# Patient Record
Sex: FEMALE | Race: WHITE | NOT HISPANIC OR LATINO | Employment: OTHER | URBAN - NONMETROPOLITAN AREA
[De-identification: names, ages, dates, MRNs, and addresses within clinical notes are randomized per-mention and may not be internally consistent; named-entity substitution may affect disease eponyms.]

---

## 2021-04-20 LAB
BILIRUB SERPL-MCNC: NEGATIVE MG/DL
BLOOD URINE, POC: NORMAL
CLARITY, POC UA: CLEAR
COLOR, POC UA: YELLOW
GLUCOSE UR QL STRIP: NEGATIVE
KETONES UR QL STRIP: NEGATIVE
LEUKOCYTE EST, POC UA: NEGATIVE
NITRITE, POC UA: NEGATIVE
PH, POC UA: 5.5
PROTEIN, POC: NEGATIVE
SPECIFIC GRAVITY, POC UA: NORMAL
UROBILINOGEN, POC UA: NORMAL

## 2021-04-28 ENCOUNTER — HISTORICAL (OUTPATIENT)
Dept: ADMINISTRATIVE | Facility: HOSPITAL | Age: 56
End: 2021-04-28

## 2021-05-11 ENCOUNTER — HISTORICAL (OUTPATIENT)
Dept: ADMINISTRATIVE | Facility: HOSPITAL | Age: 56
End: 2021-05-11

## 2021-06-23 LAB
BILIRUB SERPL-MCNC: NEGATIVE MG/DL
BLOOD URINE, POC: NORMAL
CLARITY, POC UA: CLEAR
COLOR, POC UA: YELLOW
GLUCOSE UR QL STRIP: NEGATIVE
KETONES UR QL STRIP: NEGATIVE
LEUKOCYTE EST, POC UA: NEGATIVE
NITRITE, POC UA: NEGATIVE
PH, POC UA: 5
PROTEIN, POC: NEGATIVE
SPECIFIC GRAVITY, POC UA: 1.02
UROBILINOGEN, POC UA: NORMAL

## 2021-08-03 LAB
ALBUMIN SERPL-MCNC: 4.7 G/DL (ref 3.4–5)
ALBUMIN/GLOB SERPL: 1.6 {RATIO}
ALP SERPL-CCNC: 68 U/L (ref 50–144)
ALT SERPL-CCNC: 21 U/L (ref 1–45)
AMYLASE SERPL-CCNC: 60 U/L (ref 28–100)
ANION GAP SERPL CALC-SCNC: 5 MMOL/L (ref 7–16)
AST SERPL-CCNC: 27 U/L (ref 14–36)
BASOPHILS # BLD AUTO: 0.03 X10(3)/MCL (ref 0.01–0.08)
BASOPHILS NFR BLD AUTO: 0.8 % (ref 0.1–1.2)
BASOPHILS NFR BLD MANUAL: 0 % (ref 0–2)
BILIRUB SERPL-MCNC: 0.63 MG/DL (ref 0.1–1)
BUN SERPL-MCNC: 12 MG/DL (ref 7–20)
CALCIUM SERPL-MCNC: 9.6 MG/DL (ref 8.4–10.2)
CHLORIDE SERPL-SCNC: 105 MMOL/L (ref 94–110)
CHOLEST SERPL-MCNC: 260 MG/DL (ref 0–200)
CO2 SERPL-SCNC: 29 MMOL/L (ref 21–32)
CREAT SERPL-MCNC: 0.7 MG/DL (ref 0.52–1.04)
CREAT/UREA NIT SERPL: 17.1 (ref 12–20)
DEPRECATED CALCIDIOL+CALCIFEROL SERPL-MC: 35.1 NG/ML (ref 30–100)
EOSINOPHIL # BLD AUTO: 0.12 X10(3)/MCL (ref 0.04–0.36)
EOSINOPHIL NFR BLD AUTO: 3.2 % (ref 0.7–7)
EOSINOPHIL NFR BLD MANUAL: 5 % (ref 0–3)
ERYTHROCYTE [DISTWIDTH] IN BLOOD BY AUTOMATED COUNT: 12.1 % (ref 11–14.5)
EST CREAT CLEARANCE SER (OHS): 121.7 ML/MIN
EST. AVERAGE GLUCOSE BLD GHB EST-MCNC: 108 MG/DL (ref 70–115)
ESTRADIOL SERPL HS-MCNC: 37.4 PG/ML
FSH SERPL-ACNC: 23.9 MIU/ML
GLOBULIN SER-MCNC: 2.9 G/DL (ref 2–3.9)
GLUCOSE SERPL-MCNC: 107 MGM./DL (ref 70–115)
GRANULOCYTES NFR BLD MANUAL: 51 % (ref 37–73)
H PYLORI AB SER IA-ACNC: NEGATIVE
HBA1C MFR BLD: 5.6 % (ref 4–6)
HCT VFR BLD AUTO: 39.3 % (ref 36–48)
HDLC SERPL-MCNC: 46 MG/DL (ref 40–60)
HGB BLD-MCNC: 13.3 G/DL (ref 11.8–16)
IMM GRANULOCYTES # BLD AUTO: 0.03 X10E3/UL (ref 0–0.03)
IMM GRANULOCYTES NFR BLD AUTO: 0.8 % (ref 0–0.5)
LDLC SERPL CALC-MCNC: 160.2 MG/DL (ref 30–100)
LH SERPL-ACNC: 15.2 MIU/ML
LIPASE SERPL-CCNC: 68 U/L (ref 23–300)
LYMPHOCYTES # BLD AUTO: 1.23 X10(3)/MCL (ref 1.16–3.74)
LYMPHOCYTES NFR BLD AUTO: 32.7 % (ref 20–55)
LYMPHOCYTES NFR BLD MANUAL: 33 % (ref 20–55)
MCH RBC QN AUTO: 30.1 PG (ref 27–34)
MCHC RBC AUTO-ENTMCNC: 33.8 G/DL (ref 31–37)
MCV RBC AUTO: 88.9 FL (ref 79–99)
METAMYELOCYTES NFR BLD MANUAL: 0 % (ref 0–1)
MONOCYTES # BLD AUTO: 0.49 X10(3)/MCL (ref 0.24–0.36)
MONOCYTES NFR BLD AUTO: 13 % (ref 4.7–12.5)
MONOCYTES NFR BLD MANUAL: 11 % (ref 0–10)
MYELOCYTES NFR BLD MANUAL: 0 %
NEUTROPHILS # BLD AUTO: 1.86 X10(3)/MCL (ref 1.56–6.13)
NEUTROPHILS NFR BLD AUTO: 49.5 % (ref 37–73)
NEUTS BAND NFR BLD MANUAL: 0 % (ref 0–5)
NRBC BLD MANUAL-RTO: 0 % (ref 0–1)
PLATELET # BLD AUTO: 255 X10(3)/MCL (ref 140–371)
PMV BLD AUTO: 10.7 FL (ref 9.4–12.4)
POTASSIUM SERPL-SCNC: 4 MMOL/L (ref 3.5–5.1)
PROGEST SERPL-MCNC: 0.37 NG/ML
PROT SERPL-MCNC: 7.6 G/DL (ref 6.3–8.2)
RBC # BLD AUTO: 4.42 X10(6)/MCL (ref 4–5.1)
SODIUM SERPL-SCNC: 139 MMOL/L (ref 135–145)
TESTOST SERPL-MCNC: 26.4 NG/DL (ref 5.77–77)
TRIGL SERPL-MCNC: 164 MG/DL (ref 30–200)
TSH SERPL-ACNC: 1.44 UIU/ML (ref 0.36–3.74)
VIT B12 SERPL-MCNC: 612 PG/ML (ref 211–946)
WBC # SPEC AUTO: 3.8 X10(3)/MCL (ref 4–11.5)

## 2021-08-16 ENCOUNTER — HISTORICAL (OUTPATIENT)
Dept: RADIOLOGY | Facility: HOSPITAL | Age: 56
End: 2021-08-16

## 2021-11-02 LAB
BASOPHILS # BLD AUTO: 0.02 10*3/UL (ref 0.01–0.08)
BASOPHILS NFR BLD AUTO: 0.4 % (ref 0.1–1.2)
CHOLEST SERPL-MCNC: 239 MG/DL (ref 0–200)
EOSINOPHIL # BLD AUTO: 0.21 10*3/UL (ref 0.04–0.36)
EOSINOPHIL NFR BLD AUTO: 4.5 % (ref 0.7–7)
ERYTHROCYTE [DISTWIDTH] IN BLOOD BY AUTOMATED COUNT: 12.5 % (ref 11–14.5)
HCT VFR BLD AUTO: 37.6 % (ref 36–48)
HDLC SERPL-MCNC: 51 MG/DL (ref 40–60)
HGB BLD-MCNC: 12.5 G/DL (ref 11.8–16)
IMM GRANULOCYTES # BLD AUTO: 0 10*3/UL (ref 0–0.03)
IMM GRANULOCYTES NFR BLD AUTO: 0 % (ref 0–0.5)
LDLC SERPL CALC-MCNC: 162.2 MG/DL (ref 30–100)
LYMPHOCYTES # BLD AUTO: 1.58 10*3/UL (ref 1.16–3.74)
LYMPHOCYTES NFR BLD AUTO: 34.1 % (ref 20–55)
MCH RBC QN AUTO: 29.7 PG (ref 27–34)
MCHC RBC AUTO-ENTMCNC: 33.2 G/DL (ref 31–37)
MCV RBC AUTO: 89.3 FL (ref 79–99)
MONOCYTES # BLD AUTO: 0.57 10*3/UL (ref 0.24–0.36)
MONOCYTES NFR BLD AUTO: 12.3 % (ref 4.7–12.5)
NEUTROPHILS # BLD AUTO: 2.25 10*3/UL (ref 1.56–6.13)
NEUTROPHILS NFR BLD AUTO: 48.7 % (ref 37–73)
PLATELET # BLD AUTO: 279 10*3/UL (ref 140–371)
PMV BLD AUTO: 11 FL (ref 9.4–12.4)
RBC # BLD AUTO: 4.21 10*6/UL (ref 4–5.1)
TRIGL SERPL-MCNC: 149 MG/DL (ref 30–200)
WBC # SPEC AUTO: 4.6 10*3/UL (ref 4–11.5)

## 2021-11-04 LAB
CRC RECOMMENDATION EXT: NORMAL
CRC RECOMMENDATION EXT: NORMAL

## 2022-04-09 ENCOUNTER — HISTORICAL (OUTPATIENT)
Dept: ADMINISTRATIVE | Facility: HOSPITAL | Age: 57
End: 2022-04-09
Payer: MEDICAID

## 2022-04-26 VITALS
WEIGHT: 190 LBS | HEIGHT: 67 IN | DIASTOLIC BLOOD PRESSURE: 68 MMHG | SYSTOLIC BLOOD PRESSURE: 124 MMHG | BODY MASS INDEX: 29.82 KG/M2

## 2022-04-27 LAB
HUMAN PAPILLOMAVIRUS (HPV): NORMAL
PAP RECOMMENDATION EXT: NORMAL
PAP SMEAR: NORMAL

## 2022-04-29 ENCOUNTER — HISTORICAL (OUTPATIENT)
Dept: ADMINISTRATIVE | Facility: HOSPITAL | Age: 57
End: 2022-04-29
Payer: MEDICAID

## 2022-05-02 NOTE — HISTORICAL [facility] CERNER
This is a historical note converted from Cermanda. Formatting and pictures may have been removed.  Please reference Cermanda for original formatting and attached multimedia. Chief Complaint  New patient, Annual. states she is feeling fine, denies any breast pain, or abnormal discharge.. is currently on cycle. LMP 4/15/2021.  History of Present Illness  54 y/o WF  for initial gyn exam  LMP 4/15/21, usually bleeds 7 days, spotting today  menses cyclic, mild cramping  reports hx of uterine fibroids, dx 2-3 years ago - denies irregular vaginal bleeding  Denies family history of breast, uterine, ovarian and colon cancer. UTD on colonoscopy  Gynecological History  Last Menstrual Period: 04/15/21  Menstrual Status Intake: Menarcheal  STIs/STDs: No  Intermenstrual Bleeding: No  Abnormal Pap: No  Current Birth Control Method: Abstinence  Dysmenorrhea: Mild  Flow: Moderate  Duration of Flow: 7  Frequency of Cycle: monthly  Additional GYN Information: LAST PAP unknown  Discharge OB: none  Sexual Problems OB: none  Urinary Incontinence: often.  Sexually Active: No  Review of Systems  General/Constitutional:?  Chills?denies. Fatigue/weakness?denies?. Fever?denies?. Night sweats?denies?.  Skin:  Rash?denies.  Respiratory:  Cough?denies?. Hemoptysis?denies?. SOB?denies?. Sputum production?denies?. Wheezing?denies?.  Cardiovascular:  Chest pain?denies. Dizziness?denies. Palpitations?denies. Swelling in hands/feet?denies.  Breast:  Changes in skin of nipple or breast?denies?. Breast lump?denies. Breast pain?denies. Nipple discharge?denies?.  Gastrointestinal:  Abdominal pain?denies?. Blood in stool?denies?. Constipation?denies?. Diarrhea?denies?. Heartburn?denies?. Nausea?denies?. Vomiting?deniesGenitourinary:  Incontinence?denies?. Blood in urine?denies. Frequent urination?denies?. Painful urination?denies.  Gyneocologic:  Irregular menses?denies. Heavy bleeding?denies. Painful menses?admits. Vaginal discharge/ Odor?denies?.  Vaginal lesion/pain?denies. ?Pelvic pain?denies?. Decreased libido?denies. Vulvar lesion/ulcer?denies?. Prolapse of genital organs?denies?. Painful intercourse?denies?.  Menopausal:  Hot flushes?denies. Vaginal dryness?denies.  Musculoskeletal:  Joint/andie pain?denies. Decreased ROM?denies. Muscle aches?denies. Swollen joints?denies?. Weakness?denies.  Neurologic:  Confusion?denies. Trouble walking?denies. Trouble with balance?denies?Balance difficulty?denies. Gait abnormalities?denies. Headache?denies. Tingling/Numbness?denies.  Psychiatric:  Mood lability?denies.?Anxiety or panic attacks?denies. Depressed mood?denies. Suicidal thoughts?denies.?  Physical Exam  Vitals & Measurements  T:?36.5? ?C (Temporal Artery)? BP:?130/76?  HT:?165.00?cm? WT:?90.100?kg? BMI:?33.09? LMP:?04/15/2021 00:00 CDT?  Chaperone: present  ?   General appearance: - healthy, well-nourished and well-developed  ?   Psychiatric: Orientation to time, place and person. Normal mood and affect and active, alert  ?   Skin:  Appearance: no rashes or lesions  ?   Neck:  Neck: supple, FROM, trachea midline. and no masses  Thyroid: no enlargement or nodules and non-tender.  ?  ?   Cardiovascular:  Auscultation: RRR and no murmur  Peripheral Vascular: no varicosities, LLE edema, RLE edema, calf tenderness, and palpable cord and pedal pulses intact  ?   Lungs:  Respiratory effort: no intercostal retractions or accessory muscle usage  Auscultation: no wheezing, rales/crackles, or rhonchi and clear to auscultation  ?  Breast:  Inspection/Palpation: no tenderness, discrete/distinct masses, skin changes, or abnormal secretions. Nipple appearance normal.  ?  Abdomen:  Auscultation/Inspection/Palpation: no hepatomegaly, splenomegaly, masses , tenderness? or CVA tenderness and soft, non distended bowel sounds preset  Hernia: no palpable hernias  ?  Female Genitalia:  Vulva: no masses,?tenderness or?lesions  Bladder/Urethra: no urethral discharge or mass, normal  meatus, bladder non-distended  Vagina: no tenderness, erythema, cystocele, rectocele, abnormal vaginal discharge, or vesicle(s) or ulcers  Cervix: no discharge , no cervical lacerations noted or motion tenderness and grossly normal  Uterus:?enlarged, irregular?midline, non-tender, and no uterine prolapse.  Adnexa/Parametria: no parametrial tenderness or mass, no adnexal tenderness or ovarian mass  ?  Lymph Nodes:  Palpation: non tender submandibular nodes, axillary nodes, or inguinal nodes  ?  Rectal Exam:  Rectum: normal perianal skin  Assessment/Plan  1.?Encounter for gynecological examination (general) (routine) with abnormal findings?Z01.411  ?BSE reviewed and recommended. Reviewed calcium needs, exercise, and prevention of osteoporosis. Periodic colonoscopy screening recommended. Reviewed normal menopause and menopausal symptoms.? Mammogram recommended yearly.  ?  Ordered:  Clinic Follow up, *Est. 22 3:00:00 CDT, Order for future visit, Encounter for gynecological examination (general) (routine) with abnormal findings, MOHIT CABALLERO  [facility] 40-64 years [id number] , Encounter for gynecological examination (general) (routine) with abnormal findings, MOHIT CABALLERO, 21 14:06:00 CDT  ?  2.?Dysmenorrhea?N94.6  ?  3.?Screening mammogram, encounter for?Z12.31  ?  Uterine fibroid?D25.9  Pelvic ultrasound?and follow-up with Dr. Jasiel Marquis for discussion  ?  ?DOMINGO?for? Manual?to determine?stability of fibroids  ?  Referrals  Clinic Follow up, *Est. 22 3:00:00 CDT, Order for future visit, Encounter for gynecological examination (general) (routine) with abnormal findings, MOHIT CABALLERO  Clinic Follow up, Order for future visit   OB History  Pregnancy History???(2,0,0,2)?? ??  Pregnancy # 1  Baby 1?????????????Outcome Date:?1991????? Outcome:?Live Birth  ???Outcome or Result:?Vaginal  ???Gender:?--????????Gest Age:?39 weeks  ??????Wt:?--  ???Hospital:?--????????[facility] Labor:?--  ???Shonna Name:?--?????Babys Father:?--  ?  Pregnancy # 2  Baby 1?????????????Outcome Date:?1995????? Outcome:?Live Birth  ???Outcome or Result:?Vaginal  ???Gender:?--????????Gest Age:?39 weeks ??????Wt:?--  ???Hospital:?--????????[facility] Labor:?--  ???Shonna Name:?--?????Babys Father:?--  Problem List/Past Medical History  Ongoing  Anxiety  Obesity  Historical  Pregnant  Pregnant  Procedure/Surgical History  Gallbladder   Medications  No active medications  Allergies  No Known Medication Allergies  Social History  Abuse/Neglect  No, No, Yes, 2021  Alcohol  Current, Wine, 1-2 times per month, 2018  Sexual  Sexually active: No. Gender Identity Identifies as female. No, 2021  Tobacco  Never (less than 100 in lifetime), N/A, 2021  Marital Status    Family History  Family history is negative  Health Maintenance  Health Maintenance  ???Pending?(in the next year)  ??? ??OverDue  ??? ? ? ?Alcohol Misuse Screening due??21??and every 1??year(s)  ??? ??Due?  ??? ? ? ?ADL Screening due??21??and every 1??year(s)  ??? ? ? ?Tetanus Vaccine due??21??and every 10??year(s)  ??? ??Due In Future?  ??? ? ? ?Obesity Screening not due until??22??and every 1??year(s)  ???Satisfied?(in the past 1 year)  ??? ??Satisfied?  ??? ? ? ?Blood Pressure Screening on??21.??Satisfied by Fadumo Dunn  ??? ? ? ?Body Mass Index Check on??21.??Satisfied by Fadumo Dunn  ??? ? ? ?Obesity Screening on??21.??Satisfied by Fadumo Dunn  ?

## 2022-05-03 ENCOUNTER — HISTORICAL (OUTPATIENT)
Dept: ADMINISTRATIVE | Facility: HOSPITAL | Age: 57
End: 2022-05-03

## 2022-05-11 DIAGNOSIS — Z91.89 AT HIGH RISK FOR BREAST CANCER: Primary | ICD-10-CM

## 2022-08-02 DIAGNOSIS — Z91.89 AT HIGH RISK FOR BREAST CANCER: Primary | ICD-10-CM

## 2022-08-18 ENCOUNTER — DOCUMENTATION ONLY (OUTPATIENT)
Dept: ADMINISTRATIVE | Facility: HOSPITAL | Age: 57
End: 2022-08-18
Payer: MEDICAID

## 2022-09-15 ENCOUNTER — HISTORICAL (OUTPATIENT)
Dept: ADMINISTRATIVE | Facility: HOSPITAL | Age: 57
End: 2022-09-15
Payer: MEDICAID

## 2022-10-10 NOTE — PROGRESS NOTES
[facility] Breast Surg  Breast Surgical Oncology  New Patient Office Visit - H&P    Chief Complaint:   Chief Complaint   Patient presents with    Follow-up     OQ Bilateral breast pain x 3 months since her menstrual cycles are changing, intermittent pain but constant soreness, pain scale 3/10 today, not taking any medication for pain        Subjective:      Interval History:   10/13/2022 She is doing well in interval. She currently denies any breast issues such as rashes, redness, swelling, nipple discharge, or new lumps/masses. She thinks she is going through menopause at this current time. Her periods have been irregular. She had a follow up Breast MRI scheduled for this month  (it was suppose to be a 6 month interval follow up MRI)  but her menstrual cycle interfered and she had to cancel. She states she has not rescheduled at this time.     Of note, Her lifetime risk for breast cancer was calculated using the Tyrer-Cuzick model and found to be elevated (28%).     She complains of generalized breast pain and states it went away when she stopped drinking caffeine but she is drinking tea and now the pain is back. She also is wearing wire bras.     History of Present Illness:  Ms. Suma Medrano a pleasant female patient who initially presents to breast clinic 7/22/2021 at age 55 with abnormal right breast imaging performed at an outside facility.      Her recent imaging began with a SCR MG with CAD at [facility] on 4/28/21, which noted a loosely clustered group of microcalcifications having an indeterminant appearance in the UOQ of the right breast. Further work up with a R DG MG with CAD and a R US on 5/11/2021, which was again indeterminant and recommended a breast MRI to evaluate the calcifications. BL Breast MRI w w/o Contrast on 6/1/2021 at [facility] in [place] recommended stereotactic biopsy to target the previous noted regional  calcifications. The patient has prior breast imaging that was done at [facility] many years ago but we are unable to obtain these records.      She does report a history of a trauma to the right breast upper outer quadrant when she was in her 20s consistent with a possible hematoma. She states that she has had a lump in this area since. She does report cyclic bilateral breast pain and fibrocystic changes around the time of menstruation. Otherwise, she currently denies any breast issues including rashes, redness, nipple discharge, or new lumps/masses.      According to NCCN guidelines,  she does not currently meet criteria for genetic testing. Will continue to monitor.    Imagin. 2021 SCR MG w/ CAD at [facility] - INCOMPLETE: Loosely clustered group of microcalcifications having indeterminate appearance are noted in the upper outer quadrant of the right breast.  These cannot be said with certainty to represent a benign process and further work-up with spot magnification views and ultrasound is recommended for better evaluation and to exclude neoplastic process.  2. 2021 R DG MG and R US - INCOMPLETE: Spot magnification views confirm the presence of loosely clustered, indeterminate microcalcifications with no worrisome underlying stellate mass appreciated.  No sonographic evidence of malignancy reported.  Further work-up with breast MRI is recommended to exclude remote possibility of a neoplastic process.  3. 2021 BL Breast MRI w/ w/o contrast - BIRADS 4B: Suspicious of malignancy.  Revealed a regional non-mass enhancements in the upper outer quadrant of the right breast measuring 5.8 x 1.9 cm. More medially in this area is a 14 mm area of non-mass enhancement within the fundal glandular tissue. This most likely correlates with the area of heterogenous echogenicity on the prior US.  No enhancing mass or non-mass enhancement noted to the left breast.  Biopsy recommended. Consider stereotactic biopsy to target the previous noted regional calcifications.  4. 8/16/2021 R DG MG and R breast US - SUSPICIOUS OF MALIGNANCY 1. Punctate and round calcifications in a regional distribution in the 10:00 right breast, middle to posterior depths, are suspicious. As these calcifications are seen sonographically, an ultrasound-guided biopsy of the calcifications in the 10:00 right breast, 4 cm from the nipple, is recommended. Please see separately dictated report from the ultrasound guided biopsy performed on the same day. 2. Fibrocystic changes in the upper outer quadrant of the right breast and 4:00 right breast are benign.         Pathology:   1. 8/16/2021 Right breast 10:00 4 cm from nipple mass core needle biopsy-epithelial hyperplasia of the usual type, moderate.  Sclerosing adenosis.  Pseudoangiomatous stromal hyperplasia.  Cystic and apocrine change.  No atypia.  Periductal fibrosis.  Focal periductal chronic inflammation.  Focal intraductal calcification. A follow-up bilateral breast MR in December 2021 is recommended for follow-up of the nonmass enhancement in the upper outer quadrant of the right breast as well as the non mass enhancement more medially within the right breast.    OB / GYN History   Menarche Onset: 15  Menopause: Pre, regular  Hormonal birth control (duration): a few months  Pregnancies: 2  Age at first child birth: 26  Child births: 2  Breastfeeding duration:  2years  Hysterectomy: no  Oophorectomy: no  HRT: no      Family History of Cancer (& age at diagnosis):   - Father: lung cancer (smoker)   - Paternal Aunt: lung cancer (smoker)   - Maternal GM: thyroid cancer (smoker)   - Denies history of breast, pancreatic, colon, or ovarian cancer      Lifestyle:  Smoker: Never (less than 100 in lifetime)  Height and Weight:   BMI:   ETOH consumption: yes,  occasionally      Other:  # of breast biopsies (when and pathology results): none  MG breast density:  unknown  Prior thoracic RT: none  Genetic testing: none  Ashkenazi Scientologist descent: No    Other History:     History reviewed. No pertinent past medical history.     Past Surgical History:   Procedure Laterality Date    CHOLECYSTECTOMY      COLONOSCOPY  11/04/2021    Martha Hull MD  [facility]    ESOPHAGOGASTRODUODENOSCOPY  11/04/2021    Martha Hull MD  [facility]        Social History     Socioeconomic History    Marital status:    Tobacco Use    Smoking status: Never    Smokeless tobacco: Never   Substance and Sexual Activity    Alcohol use: Yes    Drug use: Never          There is no immunization history on file for this patient.     Medications/Allergies:    Current Outpatient Medications on File Prior to Visit   Medication Sig Dispense Refill    diclofenac sodium (VOLTAREN) 1 % Gel Apply topically. prn      hydroCHLOROthiazide (MICROZIDE) 12.5 mg capsule Take 12.5 mg by mouth.      pantoprazole (PROTONIX) 40 MG tablet Take 40 mg by mouth.      naproxen (NAPROSYN) 500 MG tablet Take 1 tablet (500 mg total) by mouth 2 (two) times daily with meals. (Patient not taking: Reported on 10/13/2022) 30 tablet 0     No current facility-administered medications on file prior to visit.        Review of patient's allergies indicates:   Allergen Reactions    Sulfa (sulfonamide antibiotics) Anaphylaxis        Review of Systems:      Comprehensive ROS normal except: see HPI       Objective/Physical Exam     Vitals:    10/13/22 1045   BP: (!) 149/82   Pulse: 73   Resp: 18   Temp: 97.5 °F (36.4 °C)        General: The patient is awake, alert and oriented times three. The patient is well nourished and in no acute distress.  Neck: There is no evidence of palpable cervical, supraclavicular or axillary adenopathy. The neck is supple.   Musculoskeletal: The patient has a normal range of motion of her bilateral upper extremities.  Chest: Examination of the chest wall fails to  reveal any obvious abnormalities. Nonlabored breathing, symmetric expansion.  Breast:  Right: Examination of right breast fails to reveal any dominant masses or areas of significant focal nodularity. The nipple is everted without evidence of discharge. There is no skin dimpling with movement of the pectoralis. There are no significant skin changes overlying the breast.   Left: Examination of the left breast fails to reveal any dominant masses or areas of significant focal nodularity. The nipple is everted without evidence of discharge. There is no skin dimpling with movement of the pectoralis. There are no significant skin changes overlying the breast.  Abdomen: The abdomen is soft, flat, nontender and nondistended.  Integumentary: no rashes or skin lesions present  Neurologic: cranial nerves intact, no signs of peripheral neurological deficit, motor/sensory function intact       Assessment and Plan     There is no problem list on file for this patient.       Suma was seen today for follow-up.    Diagnoses and all orders for this visit:    At high risk for breast cancer  -     MRI Breast w/wo Contrast, w/CAD, Bilateral; Future    Personal history of benign breast biopsy  -     MRI Breast w/wo Contrast, w/CAD, Bilateral; Future    Abnormal MRI  -     MRI Breast w/wo Contrast, w/CAD, Bilateral; Future    Mastalgia in female  -     MRI Breast w/wo Contrast, w/CAD, Bilateral; Future    Screening mammogram for breast cancer  -     MRI Breast w/wo Contrast, w/CAD, Bilateral; Future  -     Mammo Digital Screening Bilat; Future     ---------------------------------------------------------------------------------------------    PLAN:  1. Recommend follow up breast MRI at next available and at appropriate time during patients menstrual cycle. She was suppose to have this done back in December of 2021.   2. RTC tentatively in 2 months to go over MRI and SCR MG.   3. Patients last SCR MG was over a year ago. Due to patients  history of not getting follow up MRI done, will go ahead and order a SCR MG so there will at least be one imaging done if patient doesn't complete MRI in the interval. SCR MG to be scheduled at next available as well.   4. Healthy lifestyle guidelines were reviewed. She was encouraged to engage in regular exercise, maintain a healthy body weight, and avoid excessive alcohol consumption. Healthy nutritional guidelines were also discussed. Self-breast examination was reviewed with the patient in detail and she was encouraged to perform this on a monthly basis.   5. Please call our office with any questions or concerns that may arise before the next appointment.   6. I discussed ways to reduce breast pain/tenderness. Avoid caffeine (coffee, teas, chocolate, sodas). Drinking alcohol may also increase the risk of fibrocystic changes and breast pain. I also advised to wear a good, supportive bra both day and night when symptoms are worse. Avoid contact sports and other activities which could cause injury to the breasts. Also encouraged patient to try Voltaren gel.       All of her questions were answered.     SAMANTHA Paul

## 2022-10-13 ENCOUNTER — OFFICE VISIT (OUTPATIENT)
Dept: SURGERY | Facility: CLINIC | Age: 57
End: 2022-10-13
Payer: MEDICAID

## 2022-10-13 VITALS
RESPIRATION RATE: 18 BRPM | SYSTOLIC BLOOD PRESSURE: 149 MMHG | BODY MASS INDEX: 31.45 KG/M2 | HEIGHT: 67 IN | TEMPERATURE: 98 F | OXYGEN SATURATION: 97 % | DIASTOLIC BLOOD PRESSURE: 82 MMHG | WEIGHT: 200.38 LBS | HEART RATE: 73 BPM

## 2022-10-13 DIAGNOSIS — N64.4 MASTALGIA IN FEMALE: ICD-10-CM

## 2022-10-13 DIAGNOSIS — Z91.89 AT HIGH RISK FOR BREAST CANCER: Primary | ICD-10-CM

## 2022-10-13 DIAGNOSIS — R93.89 ABNORMAL MRI: ICD-10-CM

## 2022-10-13 DIAGNOSIS — Z12.31 SCREENING MAMMOGRAM FOR BREAST CANCER: ICD-10-CM

## 2022-10-13 DIAGNOSIS — Z98.890 PERSONAL HISTORY OF BENIGN BREAST BIOPSY: ICD-10-CM

## 2022-10-13 PROCEDURE — 99999 PR PBB SHADOW E&M-EST. PATIENT-LVL IV: CPT | Mod: PBBFAC,,,

## 2022-10-13 PROCEDURE — 99213 OFFICE O/P EST LOW 20 MIN: CPT | Mod: S$PBB,,,

## 2022-10-13 PROCEDURE — 99214 OFFICE O/P EST MOD 30 MIN: CPT | Mod: PBBFAC

## 2022-10-13 PROCEDURE — 3079F DIAST BP 80-89 MM HG: CPT | Mod: CPTII,,,

## 2022-10-13 PROCEDURE — 1159F PR MEDICATION LIST DOCUMENTED IN MEDICAL RECORD: ICD-10-PCS | Mod: CPTII,,,

## 2022-10-13 PROCEDURE — 3079F PR MOST RECENT DIASTOLIC BLOOD PRESSURE 80-89 MM HG: ICD-10-PCS | Mod: CPTII,,,

## 2022-10-13 PROCEDURE — 99999 PR PBB SHADOW E&M-EST. PATIENT-LVL IV: ICD-10-PCS | Mod: PBBFAC,,,

## 2022-10-13 PROCEDURE — 1160F RVW MEDS BY RX/DR IN RCRD: CPT | Mod: CPTII,,,

## 2022-10-13 PROCEDURE — 1160F PR REVIEW ALL MEDS BY PRESCRIBER/CLIN PHARMACIST DOCUMENTED: ICD-10-PCS | Mod: CPTII,,,

## 2022-10-13 PROCEDURE — 99213 PR OFFICE/OUTPT VISIT, EST, LEVL III, 20-29 MIN: ICD-10-PCS | Mod: S$PBB,,,

## 2022-10-13 PROCEDURE — 3077F SYST BP >= 140 MM HG: CPT | Mod: CPTII,,,

## 2022-10-13 PROCEDURE — 3077F PR MOST RECENT SYSTOLIC BLOOD PRESSURE >= 140 MM HG: ICD-10-PCS | Mod: CPTII,,,

## 2022-10-13 PROCEDURE — 1159F MED LIST DOCD IN RCRD: CPT | Mod: CPTII,,,

## 2022-10-13 RX ORDER — PANTOPRAZOLE SODIUM 40 MG/1
40 TABLET, DELAYED RELEASE ORAL
COMMUNITY
Start: 2021-11-10

## 2022-10-13 RX ORDER — DICLOFENAC SODIUM 10 MG/G
GEL TOPICAL
COMMUNITY
Start: 2021-11-02

## 2022-10-13 RX ORDER — HYDROCHLOROTHIAZIDE 12.5 MG/1
12.5 CAPSULE ORAL
COMMUNITY
Start: 2021-11-10 | End: 2023-12-19

## 2022-10-31 ENCOUNTER — HOSPITAL ENCOUNTER (OUTPATIENT)
Dept: RADIOLOGY | Facility: HOSPITAL | Age: 57
Discharge: HOME | End: 2022-10-31
Payer: MEDICAID

## 2022-10-31 DIAGNOSIS — Z12.31 SCREENING MAMMOGRAM FOR BREAST CANCER: ICD-10-CM

## 2022-12-09 ENCOUNTER — TELEPHONE (OUTPATIENT)
Dept: SURGERY | Facility: CLINIC | Age: 57
End: 2022-12-09
Payer: MEDICAID

## 2022-12-09 NOTE — TELEPHONE ENCOUNTER
Called and spoke w/ patient to f/u on imaging. Patient's MG appt was cancelled due to patient not having the MRI done prior to getting MG. Patient still has cycles and the MRI is scheduled on 12/22/22. Was advised to give Scheduling a call once cycle start. Patient verbally understood. Patient have follow up appt in clinic on 12/20/22. Due to no imaging being done, patient was recommended to follow up in clinic after imaging is complete. Patient stated that she would like to to cancel appt for now until her imaging is done. Appt for 12/15/22 has been cancelled. Advised patient to give office a call to r/s so provider can order imaging.

## 2023-01-24 ENCOUNTER — DOCUMENTATION ONLY (OUTPATIENT)
Dept: ADMINISTRATIVE | Facility: HOSPITAL | Age: 58
End: 2023-01-24

## 2023-10-19 ENCOUNTER — TELEPHONE (OUTPATIENT)
Dept: OBSTETRICS AND GYNECOLOGY | Facility: CLINIC | Age: 58
End: 2023-10-19
Payer: MEDICAID

## 2023-10-19 DIAGNOSIS — B37.9 YEAST INFECTION: Primary | ICD-10-CM

## 2023-10-19 RX ORDER — FLUCONAZOLE 200 MG/1
200 TABLET ORAL
Qty: 4 TABLET | Refills: 0 | Status: SHIPPED | OUTPATIENT
Start: 2023-10-19 | End: 2023-10-26

## 2023-10-19 NOTE — TELEPHONE ENCOUNTER
Per Dr kee- send out Diflucan 20mg q 48 hours x4 doses. If no improvement, will need to come into clinic. No answer x1- medication was sent to pharmacy.

## 2023-10-19 NOTE — TELEPHONE ENCOUNTER
----- Message from Heike Trivedi sent at 10/19/2023  1:29 PM CDT -----  Regarding: Call Back  Type:  Patient Returning Call    Who Called:  Who Left Message for Patient:  Does the patient know what this is regarding?:  Would the patient rather a call back or a response via [facility]?   Best Call Back Number:[phone]  Additional Information: Has bad yeast infection. Pt is watching 4 year old grand baby today so wouldn't be able to come in. Asking if something can be sent in.  [facility]

## 2023-10-20 NOTE — TELEPHONE ENCOUNTER
Spoke with patient. Sts she received her medication at the pharmacy. Educated if s/s does not improve, schedule an appointment. Verbalized understanding.

## 2023-10-23 ENCOUNTER — HOSPITAL ENCOUNTER (OUTPATIENT)
Dept: RADIOLOGY | Facility: HOSPITAL | Age: 58
Discharge: HOME | End: 2023-10-23
Payer: MEDICAID

## 2023-10-23 PROCEDURE — 77063 MAMMO DIGITAL SCREENING BILAT WITH TOMO: ICD-10-PCS | Mod: 26,,, | Performed by: RADIOLOGY

## 2023-10-23 PROCEDURE — 77067 SCR MAMMO BI INCL CAD: CPT | Mod: TC

## 2023-10-23 PROCEDURE — 77067 SCR MAMMO BI INCL CAD: CPT | Mod: 26,,, | Performed by: RADIOLOGY

## 2023-10-23 PROCEDURE — 77063 BREAST TOMOSYNTHESIS BI: CPT | Mod: 26,,, | Performed by: RADIOLOGY

## 2023-10-23 PROCEDURE — 77067 MAMMO DIGITAL SCREENING BILAT WITH TOMO: ICD-10-PCS | Mod: 26,,, | Performed by: RADIOLOGY

## 2023-11-14 ENCOUNTER — OFFICE VISIT (OUTPATIENT)
Dept: OBSTETRICS AND GYNECOLOGY | Facility: CLINIC | Age: 58
End: 2023-11-14
Payer: MEDICAID

## 2023-11-14 VITALS
WEIGHT: 190.19 LBS | DIASTOLIC BLOOD PRESSURE: 88 MMHG | TEMPERATURE: 98 F | HEIGHT: 67 IN | BODY MASS INDEX: 29.85 KG/M2 | SYSTOLIC BLOOD PRESSURE: 120 MMHG

## 2023-11-14 DIAGNOSIS — Z12.11 COLON CANCER SCREENING: ICD-10-CM

## 2023-11-14 DIAGNOSIS — Z01.411 ABNORMAL GYNECOLOGICAL EXAMINATION: Primary | ICD-10-CM

## 2023-11-14 DIAGNOSIS — N60.11 BILATERAL FIBROCYSTIC BREAST DISEASE (FCBD): ICD-10-CM

## 2023-11-14 DIAGNOSIS — N60.12 BILATERAL FIBROCYSTIC BREAST DISEASE (FCBD): ICD-10-CM

## 2023-11-14 DIAGNOSIS — Z13.31 POSITIVE DEPRESSION SCREENING: ICD-10-CM

## 2023-11-14 PROCEDURE — 99396 PR PREVENTIVE VISIT,EST,40-64: ICD-10-PCS | Mod: ,,, | Performed by: NURSE PRACTITIONER

## 2023-11-14 PROCEDURE — 1160F RVW MEDS BY RX/DR IN RCRD: CPT | Mod: CPTII,,, | Performed by: NURSE PRACTITIONER

## 2023-11-14 PROCEDURE — 3079F PR MOST RECENT DIASTOLIC BLOOD PRESSURE 80-89 MM HG: ICD-10-PCS | Mod: CPTII,,, | Performed by: NURSE PRACTITIONER

## 2023-11-14 PROCEDURE — 99396 PREV VISIT EST AGE 40-64: CPT | Mod: ,,, | Performed by: NURSE PRACTITIONER

## 2023-11-14 PROCEDURE — 3008F PR BODY MASS INDEX (BMI) DOCUMENTED: ICD-10-PCS | Mod: CPTII,,, | Performed by: NURSE PRACTITIONER

## 2023-11-14 PROCEDURE — 1160F PR REVIEW ALL MEDS BY PRESCRIBER/CLIN PHARMACIST DOCUMENTED: ICD-10-PCS | Mod: CPTII,,, | Performed by: NURSE PRACTITIONER

## 2023-11-14 PROCEDURE — 1159F PR MEDICATION LIST DOCUMENTED IN MEDICAL RECORD: ICD-10-PCS | Mod: CPTII,,, | Performed by: NURSE PRACTITIONER

## 2023-11-14 PROCEDURE — 3074F PR MOST RECENT SYSTOLIC BLOOD PRESSURE < 130 MM HG: ICD-10-PCS | Mod: CPTII,,, | Performed by: NURSE PRACTITIONER

## 2023-11-14 PROCEDURE — 3074F SYST BP LT 130 MM HG: CPT | Mod: CPTII,,, | Performed by: NURSE PRACTITIONER

## 2023-11-14 PROCEDURE — 3008F BODY MASS INDEX DOCD: CPT | Mod: CPTII,,, | Performed by: NURSE PRACTITIONER

## 2023-11-14 PROCEDURE — 3079F DIAST BP 80-89 MM HG: CPT | Mod: CPTII,,, | Performed by: NURSE PRACTITIONER

## 2023-11-14 PROCEDURE — 1159F MED LIST DOCD IN RCRD: CPT | Mod: CPTII,,, | Performed by: NURSE PRACTITIONER

## 2023-11-14 NOTE — PROGRESS NOTES
Chief Complaint: Annual exam    Chief Complaint   Patient presents with    Well Woman     Last pap 22 WNL.MMG 10/23/23 benign. Colonoscopy 21 benign.        HPI:   58 y.o. F  presents for an annual gyn exam.  Reports having regular menstrual cycles, bleeding 7 days with no cramping.    Hx right breast bx approx 2 years ago, benign. MMG done 10/23/23, benign  Had MRI 10/23/23, benign findings.  Followed by Dr Jerome in past, will be changing to another to follow breasts.     FmHx: negative for breast, uterine, ovarian, and colon cancers.     Labs / Significant Studies:  LMP: 10/15/2023  Frequency: q month   Cycle length: 7 days   Flow: heavy  Intermenstrual bleeding: No  Postcoital bleeding: No  Dysmenorrhea: No  Sexually active: Yes    Dyspareunia: No  Contraception: Partner Vasectomy    H/o STI: No   Last pap: 22 Wnl  H/o abnl pap: No   Colposcopy: NA  Garda    Family History   Problem Relation Age of Onset    Lung cancer Father     Thyroid cancer Maternal Grandmother     Lung cancer Paternal Aunt          History reviewed. No pertinent past medical history.  Past Surgical History:   Procedure Laterality Date    CHOLECYSTECTOMY      COLONOSCOPY  2021    Martha Hull MD  [facility]    COLONOSCOPY  2021    ESOPHAGOGASTRODUODENOSCOPY  2021    Martha Hull MD  [facility]       Current Outpatient Medications:     diclofenac sodium (VOLTAREN) 1 % Gel, Apply topically. prn, Disp: , Rfl:     hydroCHLOROthiazide (MICROZIDE) 12.5 mg capsule, Take 12.5 mg by mouth., Disp: , Rfl:     naproxen (NAPROSYN) 500 MG tablet, Take 1 tablet (500 mg total) by mouth 2 (two) times daily with meals., Disp: 30 tablet, Rfl: 0    pantoprazole (PROTONIX) 40 MG tablet, Take 40 mg by mouth., Disp: , Rfl:     Review of patient's allergies indicates:   Allergen Reactions    Sulfa (sulfonamide antibiotics) Anaphylaxis       Social History     Tobacco Use     "Smoking status: Never    Smokeless tobacco: Never   Substance Use Topics    Alcohol use: Yes    Drug use: Never       Review of Systems:  General/Constitutional: Chills denies. Fatigue/weakness denies. Fever denies. Night sweats denies. Hot flashes denies    Respiratory: Cough denies. Hemoptysis denies. SOB denies. Sputum production denies. Wheezing denies .   Cardiovascular: Chest pain denies . Dizziness denies. Palpitations denies. Swelling in hands/feet denies    Gastrointestinal: Abdominal pain denies. Blood in stool denies. Constipation denies. Diarrhea denies. Heartburn denies. Nausea denies. Vomiting denies    Genitourinary: Incontinence denies. Blood in urine denies. Frequent urination denies. Painful urination denies. Urinary urgency denies. Nocturia denies    Gynecologic: Irregular menses denies. Heavy bleeding denies. Painful menses denies. Vaginal discharge denies. Vaginal odor denies. Vaginal itching denies. Vaginal lesion denies. Pelvic pain denies. Decreased libido denies. Vulvar lesion denies. Prolapse of genital organs denies. Painful intercourse denies. Postcoital bleeding denies    Psychiatric: Depression denies. Anxiety denies     Physical Exam:   Vitals:    11/14/23 1420   BP: 120/88   Temp: 97.9 °F (36.6 °C)   Weight: 86.3 kg (190 lb 3.2 oz)   Height: 5' 7" (1.702 m)       Body mass index is 29.79 kg/m².       Chaperone: present.     General appearance: healthy, well-nourished and well-developed     Psychiatric: Orientation to time, place and person. Normal mood and affect and active, alert     Skin: Appearance: no rashes or lesions.     Neck:   Neck: supple, FROM, trachea midline. and no masses   Thyroid: no enlargement or nodules and non-tender.       Cardiovascular:   Auscultation: RRR and no murmur.   Peripheral Vascular: no varicosities, LLE edema, RLE edema, calf tenderness, and palpable cord and pedal pulses intact.     Lungs:   Respiratory effort: no intercostal retractions or accessory " muscle usage.   Auscultation: no wheezing, rales/crackles, or rhonchi and clear to auscultation.     Breast:   Inspection/Palpation: no tenderness, discrete/distinct masses, skin changes, or abnormal secretions. Nipple appearance normal. + FBC changes    Abdomen:   Auscultation/Inspection/Palpation: no hepatomegaly, splenomegaly, masses, tenderness or CVA tenderness and soft, non-distended bowel sounds preset.    Hernia: no palpable hernias.     Female Genitalia:    Vulva: no masses, tenderness or lesions    Bladder/Urethra: no urethral discharge or mass, normal meatus, bladder non-distended.    Vagina: no tenderness, erythema, cystocele, rectocele, abnormal vaginal discharge or vesicle(s) or ulcers    Cervix: no discharge, no cervical lacerations noted or motion tenderness and grossly normal    Uterus: normal size and shape and midline, non-tender, and no uterine prolapse.    Adnexa/Parametria: no parametrial tenderness or mass, no adnexal tenderness or ovarian mass.     Lymph Nodes:   Palpation: non tender submandibular nodes, axillary nodes, or inguinal nodes.     Rectal Exam:   Rectum: normal perianal skin.       Assessment:     There is no problem list on file for this patient.      Health Maintenance Due   Topic Date Due    Hepatitis C Screening  Never done    COVID-19 Vaccine (1) Never done    HIV Screening  Never done    Shingles Vaccine (1 of 2) Never done    Influenza Vaccine (1) Never done     Health Maintenance Topics with due status: Not Due       Topic Last Completion Date    TETANUS VACCINE 08/03/2021    Hemoglobin A1c (Diabetic Prevention Screening) 08/03/2021    Lipid Panel 11/02/2021    Colorectal Cancer Screening 11/04/2021    Cervical Cancer Screening 04/27/2022    Mammogram 10/23/2023         Plan:    Suma was seen today for well woman.    Diagnoses and all orders for this visit:    Abnormal gynecological examination  PAP   Reviewed calcium needs, exercise, and prevention of  osteoporosis.  Healthy diet  Annual MMG  Discussed breast self-awareness  Colonoscopy q 10 yrs  Reviewed normal menopause and menopausal symptoms  RTC 1 yr     Colon cancer screening  Cologuard orders sent  -     Cologuard Screening (Multitarget Stool DNA); Future  -     Cologuard Screening (Multitarget Stool DNA)    Positive depression screening  Does not desire treatment at this time    Bilateral fibrocystic breast disease (FCBD)  - Discussed recommendations of annual screening after age of 40 with mammogram and MRI for patients with lifetime risk greater than 25%     - Explained that screening is not 100% reliable.  Advised patient if she notices any changes to her breast including a lump, mass, dimpling, discharge, rash, or tenderness she should contact us immediately.     - Recommend monthly BSE          Pt has long hx of depression, does not desire medication at this time.  Denies SI/HI.I have used clinical judgement based on duration and functional status to consider definite necessity for treatment.

## 2023-11-20 LAB — PSYCHE PATHOLOGY RESULT: NORMAL

## 2023-12-04 NOTE — PROGRESS NOTES
[facility] [place] [facility]  Breast Surgical Oncology  High Risk Follow Up    Chief Complaint:   Chief Complaint   Patient presents with    Follow-up     Patient reports a intermittent right breast tenderness in RUQ worst with caffeine intake no pain, no redness, no nipple discharge        Subjective:      Interval History:   12/05/2023 She is doing well in interval. Patient states she gets intermittent right breast pain. She locates this pain in the UOQ. She has not tried anything to relieve this pain. She drinks caffeine and wears wire bras. She currently denies any other breast issues such as rashes, redness, swelling, nipple discharge, or new lumps/masses.  She does not perform self breast exams. She still gets her cycles regularly.  Patient had screening mammogram and breast MRI done in October, and they both resulted as benign.  She has no other complaints or concerns at this time.     History of Present Illness:  Ms. Suma Medrano a pleasant female patient who initially presents to breast clinic 7/22/2021 at age 55 with abnormal right breast imaging performed at an outside facility.     Her recent imaging began with a SCR MG with CAD at [facility] on 4/28/21, which noted a loosely clustered group of microcalcifications having an indeterminant appearance in the UOQ of the right breast. Further work up with a R DG MG with CAD and a R US on 5/11/2021, which was again indeterminant and recommended a breast MRI to evaluate the calcifications. BL Breast MRI w w/o Contrast on 6/1/2021 at [facility] in [place] recommended stereotactic biopsy to target the previous noted regional calcifications. The patient has prior breast imaging that was done at [facility] many years ago but we are unable to obtain these records.     She does report a history of a trauma to the right breast upper outer quadrant when she was in her 20s  consistent with a possible hematoma. She states that she has had a lump in this area since. She does report cyclic bilateral breast pain and fibrocystic changes around the time of menstruation.      According to NCCN guidelines,  she does not currently meet criteria for genetic testing. Will continue to monitor.    Of note, Her lifetime risk for breast cancer was calculated using the Tyrer-Cuzick model and found to be elevated (27.2%).    Imagin. 2021 SCR MG w/ CAD at [facility] - INCOMPLETE: Loosely clustered group of microcalcifications having indeterminate appearance are noted in the upper outer quadrant of the right breast.  These cannot be said with certainty to represent a benign process and further work-up with spot magnification views and ultrasound is recommended for better evaluation and to exclude neoplastic process.  2. 2021 R DG MG and R US - INCOMPLETE: Spot magnification views confirm the presence of loosely clustered, indeterminate microcalcifications with no worrisome underlying stellate mass appreciated.  No sonographic evidence of malignancy reported.  Further work-up with breast MRI is recommended to exclude remote possibility of a neoplastic process.  3. 2021 BL Breast MRI w/ w/o contrast - BIRADS 4B: Suspicious of malignancy.  Revealed a regional non-mass enhancements in the upper outer quadrant of the right breast measuring 5.8 x 1.9 cm. More medially in this area is a 14 mm area of non-mass enhancement within the fundal glandular tissue. This most likely correlates with the area of heterogenous echogenicity on the prior US.  No enhancing mass or non-mass enhancement noted to the left breast. Biopsy recommended. Consider stereotactic biopsy to target the previous noted regional calcifications.  4. 2021 R DG MG and R breast US - SUSPICIOUS OF MALIGNANCY 1. Punctate and round calcifications in a regional distribution in the 10:00 right breast,  middle to posterior depths, are suspicious. As these calcifications are seen sonographically, an ultrasound-guided biopsy of the calcifications in the 10:00 right breast, 4 cm from the nipple, is recommended. Please see separately dictated report from the ultrasound guided biopsy performed on the same day. 2. Fibrocystic changes in the upper outer quadrant of the right breast and 4:00 right breast are benign.  5. 10/23/2023 SC MG- There is no mammographic evidence of malignancy. BI-RADS: 2 Benign   6. 10/23/2023 Breast MRI- No MR evidence of malignancy in either breast.  Previously described non mass enhancement in the upper-outer quadrant of the right breast and central right breast seen on the prior MR dated 06/01/2021 is not significantly changed compared to the prior examination and is thus now considered benign given greater than 2 year stability. BI-RADS: 2 Benign        Pathology:   1. 8/16/2021 Right breast 10:00 4 cm from nipple mass core needle biopsy-epithelial hyperplasia of the usual type, moderate.  Sclerosing adenosis.  Pseudoangiomatous stromal hyperplasia.  Cystic and apocrine change.  No atypia.  Periductal fibrosis.  Focal periductal chronic inflammation.  Focal intraductal calcification. A follow-up bilateral breast MR in December 2021 is recommended for follow-up of the nonmass enhancement in the upper outer quadrant of the right breast as well as the non mass enhancement more medially within the right breast.    OB / GYN History   Menarche Onset: 15  Menopause: Pre, regular  Hormonal birth control (duration): a few months  Pregnancies: 2  Age at first child birth: 26  Child births: 2  Breastfeeding duration:  2 years  Hysterectomy: no  Oophorectomy: no  HRT: no      Family History of Cancer (& age at diagnosis):   - Father: lung cancer (smoker)   - Paternal Aunt: lung cancer (smoker)   - Maternal GM: thyroid cancer (smoker)   - Denies history of breast, pancreatic, colon, or ovarian cancer       Lifestyle:  Smoker: Never (less than 100 in lifetime)  Height and Weight:   BMI:   ETOH consumption: yes,  occasionally      Other:  # of breast biopsies (when and pathology results): none  MG breast density: BIRADS C   Prior thoracic RT: none  Genetic testing: none  Ashkenazi Yazdanism descent: No    Other History:     Past Medical History:   Diagnosis Date    Allergy     Arthritis     Asthma     GERD (gastroesophageal reflux disease)     Hyperlipidemia     Hypertension         Past Surgical History:   Procedure Laterality Date    CHOLECYSTECTOMY      COLONOSCOPY  11/04/2021    Martha Hull MD  [facility]    COLONOSCOPY  11/04/2021    ESOPHAGOGASTRODUODENOSCOPY  11/04/2021    Martha Hull MD  [facility]        Social History     Socioeconomic History    Marital status: Unknown   Tobacco Use    Smoking status: Never    Smokeless tobacco: Never   Substance and Sexual Activity    Alcohol use: Yes    Drug use: Never   Social History Narrative    ** Merged History Encounter **             Immunization History   Administered Date(s) Administered    Tdap 08/03/2021        Medications/Allergies:    Current Outpatient Medications on File Prior to Visit   Medication Sig Dispense Refill    hydroCHLOROthiazide (MICROZIDE) 12.5 mg capsule Take 12.5 mg by mouth.      diclofenac sodium (VOLTAREN) 1 % Gel Apply topically. prn      naproxen (NAPROSYN) 500 MG tablet Take 1 tablet (500 mg total) by mouth 2 (two) times daily with meals. (Patient not taking: Reported on 12/5/2023) 30 tablet 0    pantoprazole (PROTONIX) 40 MG tablet Take 40 mg by mouth.       No current facility-administered medications on file prior to visit.        Review of patient's allergies indicates:   Allergen Reactions    Sulfa (sulfonamide antibiotics) Anaphylaxis        Review of Systems:      Comprehensive ROS normal except: see HPI       Objective/Physical Exam     Vitals:    12/05/23 1054   BP: (!) 146/83    Pulse: 76   Resp: 18   Temp: 97.5 °F (36.4 °C)        General: The patient is awake, alert and oriented times three. The patient is well nourished and in no acute distress.  Neck: There is no evidence of palpable cervical, supraclavicular or axillary adenopathy. The neck is supple. The thyroid is not enlarged.  Musculoskeletal: The patient has a normal range of motion of her bilateral upper extremities.  Chest: Examination of the chest wall fails to reveal any obvious abnormalities.  The lungs are clear to auscultation bilaterally without rales, rhonchi, or wheezing.  Cardiovascular: The heart has a regular rate and rhythm without murmurs, gallops or rubs.  Breast:   Right:  Examination of right breast fails to reveal any dominant masses or areas of significant focal nodularity. The nipple is everted without evidence of discharge. There is no skin dimpling with movement of the pectoralis. There is no significant skin changes overlying the breast.   Left:  Examination of the left breast fails to reveal any dominant masses or areas of significant focal nodularity. The nipple is everted without evidence of discharge. There is no skin dimpling with movement of the pectoralis. There are no significant skin changes overlying the breast.  Abdomen: The abdomen is soft, flat, nontender and nondistended with no palpable masses or organomegaly.  Integumentary: no rashes or skin lesions present  Neurologic: cranial nerves intact, no signs of peripheral neurological deficit, motor/sensory function intact       Assessment and Plan     There is no problem list on file for this patient.         Suma was seen today for follow-up.    Diagnoses and all orders for this visit:    At high risk for breast cancer  -     Mammo Digital Screening Bilat w/ Santino; Future  -     US BREAST SCREENING BILATERAL; Future    Mastalgia in female    Personal history of benign breast biopsy  -     Mammo Digital Screening Bilat w/ Santino; Future  -      US BREAST SCREENING BILATERAL; Future    Screening mammogram for breast cancer  -     Mammo Digital Screening Bilat w/ Santino; Future         ---------------------------------------------------------------------------------------------    PLAN:  1. Lifestyle - Healthy lifestyle guidelines were reviewed. She was encouraged to engage in regular exercise, maintain a healthy body weight, and avoid excessive alcohol consumption. Healthy nutritional guidelines were also discussed. Self-breast examination was reviewed with the patient in detail and she was encouraged to perform this on a monthly basis.    2. Screening - Recommend follow up SC MG and US  in October 2024.  Patient had breast MRI done in October 2023, but she would like to hold off on undergoing another breast MRI at this time. We will revisit breast MRIs at next visit.      3. Follow up - RTC in 1 year.    4. Breast Pain - I discussed ways to reduce breast pain/tenderness. Avoid caffeine (coffee, teas, chocolate, sodas). Drinking alcohol may also increase the risk of fibrocystic changes and breast pain. I also advised to wear a good, supportive bra both day and night when symptoms are worse. Avoid contact sports and other activities which could cause injury to the breasts. Also encouraged patient to try Voltaren gel.       All of her questions were answered. Please call our office with any questions or concerns that may arise before the next appointment.     Radha Hayward PA-C      Total time on the date of the visit ranged from 30-39 mins (52007). Total time includes both face-to-face and non-face-to-face time personally spent by myself on the day of the visit.    Non-face-to-face time included:  _X_ preparing to see the patient such as reviewing the patient record  __ obtaining and reviewing separately obtained history  _X_ independently interpreting results  _X_ documenting clinical information in electronic health record.    Face-to-face time included:  _X_  performing an appropriate history and examination  _X_ communicating results to the patient  _X_ counseling and educating the patient  __ ordering appropriate medications  __ ordering appropriate tests  _X_ ordering appropriate procedures (including follow-up)  _X_ answering any questions the patient had    Total Time spent on date of visit: 35 minutes

## 2023-12-05 ENCOUNTER — OFFICE VISIT (OUTPATIENT)
Dept: SURGERY | Facility: CLINIC | Age: 58
End: 2023-12-05
Payer: MEDICAID

## 2023-12-05 VITALS
OXYGEN SATURATION: 98 % | SYSTOLIC BLOOD PRESSURE: 146 MMHG | DIASTOLIC BLOOD PRESSURE: 83 MMHG | BODY MASS INDEX: 30.48 KG/M2 | HEART RATE: 76 BPM | RESPIRATION RATE: 18 BRPM | TEMPERATURE: 98 F | WEIGHT: 194.19 LBS | HEIGHT: 67 IN

## 2023-12-05 DIAGNOSIS — Z98.890 PERSONAL HISTORY OF BENIGN BREAST BIOPSY: ICD-10-CM

## 2023-12-05 DIAGNOSIS — Z12.31 SCREENING MAMMOGRAM FOR BREAST CANCER: ICD-10-CM

## 2023-12-05 DIAGNOSIS — N64.4 MASTALGIA IN FEMALE: ICD-10-CM

## 2023-12-05 DIAGNOSIS — Z91.89 AT HIGH RISK FOR BREAST CANCER: Primary | ICD-10-CM

## 2023-12-05 PROCEDURE — 3079F PR MOST RECENT DIASTOLIC BLOOD PRESSURE 80-89 MM HG: ICD-10-PCS | Mod: CPTII,,,

## 2023-12-05 PROCEDURE — 99999 PR PBB SHADOW E&M-EST. PATIENT-LVL IV: ICD-10-PCS | Mod: PBBFAC,,,

## 2023-12-05 PROCEDURE — 3008F PR BODY MASS INDEX (BMI) DOCUMENTED: ICD-10-PCS | Mod: CPTII,,,

## 2023-12-05 PROCEDURE — 1159F PR MEDICATION LIST DOCUMENTED IN MEDICAL RECORD: ICD-10-PCS | Mod: CPTII,,,

## 2023-12-05 PROCEDURE — 1160F RVW MEDS BY RX/DR IN RCRD: CPT | Mod: CPTII,,,

## 2023-12-05 PROCEDURE — 3079F DIAST BP 80-89 MM HG: CPT | Mod: CPTII,,,

## 2023-12-05 PROCEDURE — 99214 OFFICE O/P EST MOD 30 MIN: CPT | Mod: PBBFAC

## 2023-12-05 PROCEDURE — 99214 OFFICE O/P EST MOD 30 MIN: CPT | Mod: S$PBB,,,

## 2023-12-05 PROCEDURE — 1159F MED LIST DOCD IN RCRD: CPT | Mod: CPTII,,,

## 2023-12-05 PROCEDURE — 3077F PR MOST RECENT SYSTOLIC BLOOD PRESSURE >= 140 MM HG: ICD-10-PCS | Mod: CPTII,,,

## 2023-12-05 PROCEDURE — 3077F SYST BP >= 140 MM HG: CPT | Mod: CPTII,,,

## 2023-12-05 PROCEDURE — 99999 PR PBB SHADOW E&M-EST. PATIENT-LVL IV: CPT | Mod: PBBFAC,,,

## 2023-12-05 PROCEDURE — 1160F PR REVIEW ALL MEDS BY PRESCRIBER/CLIN PHARMACIST DOCUMENTED: ICD-10-PCS | Mod: CPTII,,,

## 2023-12-05 PROCEDURE — 3008F BODY MASS INDEX DOCD: CPT | Mod: CPTII,,,

## 2023-12-05 PROCEDURE — 99214 PR OFFICE/OUTPT VISIT, EST, LEVL IV, 30-39 MIN: ICD-10-PCS | Mod: S$PBB,,,

## 2023-12-18 NOTE — PROGRESS NOTES
SUBJECTIVE:  Suma Medrano is a 58 y.o. female here accompanied by grandson for Follow-up      HPI  Patient presents for follow up on chronic conditions. Fasting for labs. Needs Hep C and HIV screening. C/O mild urinary incontinence when coughs/sneezes. Needs refill on HCTZ. requesting nebulizer and inhaler for exercise induced asthma. She is following with high risk breast specialist for abnormal mammogram and with biopsies that were benign. She is up to date on gyn exam.  She is with occasional sinues headaches. Vision without problems, hearing good, no mouth complaints, no trouble swallowing. GERD occasionally, did have improvement with protonix in past and would like to take as needed. She denies any shortness of breath but does have history of exercised induced asthmas and recent exacerbation with bronchitis. Ongoing chest congestion and tightness. Was using albuterol nebulized with relief. She does need nebulizer machine. She would like on the go abluterol inhaler for as needed as well. Flares ups only about once a year but with correlation with sinus issues as well. She denies chest pain, high blood pressure reading, or swelling in lower extremities. Loose  stools daily and with occasional constipation. Has had colonoscopy in past with no finding. Recent cologuard done, no results yet. She denies blood in stool. She does have stress incontinence ongoign for over one year. Worsening in past 6 months. NO abnormal falling of bladder. NO burning with urinating or other urinating issues. She does not want medication at this time. Mild depression and anxiety but does not want medication at this time. Otherwise feeling well with no major complaints.     Suma's allergies, medications, history, and problem list were updated as appropriate.    Review of Systems   Respiratory:  Positive for chest tightness and shortness of breath.    Genitourinary:  Positive for urgency.   Neurological:  Positive for  "headaches.   All other systems reviewed and are negative.     A comprehensive review of symptoms was completed and negative except as noted above.    OBJECTIVE:  Vital signs  Vitals:    12/19/23 1111   BP: 126/74   BP Location: Left arm   Patient Position: Sitting   Pulse: 86   Resp: 18   Temp: 97.1 °F (36.2 °C)   TempSrc: Temporal   SpO2: 96%   Weight: 88.5 kg (195 lb)   Height: 5' 7" (1.702 m)        Physical Exam  Vitals and nursing note reviewed.   Constitutional:       Appearance: Normal appearance.   HENT:      Head: Normocephalic and atraumatic.      Right Ear: Tympanic membrane, ear canal and external ear normal.      Left Ear: Tympanic membrane, ear canal and external ear normal.      Nose: Nose normal.      Mouth/Throat:      Mouth: Mucous membranes are moist.      Pharynx: Oropharynx is clear.   Eyes:      Extraocular Movements: Extraocular movements intact.      Conjunctiva/sclera: Conjunctivae normal.      Pupils: Pupils are equal, round, and reactive to light.   Cardiovascular:      Rate and Rhythm: Normal rate and regular rhythm.      Pulses: Normal pulses.      Heart sounds: Normal heart sounds.   Pulmonary:      Effort: Pulmonary effort is normal.      Breath sounds: Normal breath sounds.   Abdominal:      General: Abdomen is flat. Bowel sounds are normal.      Palpations: Abdomen is soft.   Musculoskeletal:         General: Normal range of motion.      Cervical back: Normal range of motion.   Skin:     General: Skin is warm and dry.      Capillary Refill: Capillary refill takes less than 2 seconds.   Neurological:      General: No focal deficit present.      Mental Status: She is alert.   Psychiatric:         Mood and Affect: Mood normal.         Behavior: Behavior normal.         Thought Content: Thought content normal.         Judgment: Judgment normal.                 ASSESSMENT/PLAN:    1. Gastroesophageal reflux disease, unspecified whether esophagitis present  Assessment & Plan:  Protonix prn " but overall improved    Orders:  -     pantoprazole (PROTONIX) 40 MG tablet; Take 1 tablet (40 mg total) by mouth daily as needed (GERD).  Dispense: 30 tablet; Refill: 11    2. Mild intermittent asthma with acute exacerbation  Assessment & Plan:  Albuterol and budesonide prn, prefers nebulizer   She in need of nebulizer machine   Albuterol inhaler if needed    Orders:  -     albuterol (VENTOLIN HFA) 90 mcg/actuation inhaler; Inhale 2 puffs into the lungs every 6 (six) hours as needed for Wheezing. Rescue  Dispense: 18 g; Refill: 0  -     albuterol (PROVENTIL) 2.5 mg /3 mL (0.083 %) nebulizer solution; Take 3 mLs (2.5 mg total) by nebulization every 6 (six) hours as needed for Wheezing. Rescue  Dispense: 3 mL; Refill: 0  -     budesonide (PULMICORT) 0.5 mg/2 mL nebulizer solution; Take 2 mLs (0.5 mg total) by nebulization once daily. Controller  Dispense: 60 mL; Refill: 11  -     nebulizer and compressor Jacquelin; Nebulizer with supplies for J45.21  Dispense: 1 each; Refill: 0    3. Hypertension, unspecified type  Assessment & Plan:  The current medical regimen is effective;  continue present plan and medications.      Orders:  -     hydroCHLOROthiazide (HYDRODIURIL) 25 MG tablet; Take 1 tablet (25 mg total) by mouth once daily.  Dispense: 90 tablet; Refill: 3  -     CBC Auto Differential  -     Comprehensive Metabolic Panel  -     Lipid Panel    4. Chronic allergic rhinitis  Assessment & Plan:  Claritin qday     Orders:  -     loratadine (CLARITIN) 10 mg tablet; Take 1 tablet (10 mg total) by mouth once daily.  Dispense: 90 tablet; Refill: 3    5. Need for hepatitis C screening test  -     [facility] ORDERABLE HCS RN    6. Encounter for screening for HIV  -     HIV 1/2 Ag/Ab (4th Gen)    7. Long term current use of therapeutic drug  -     TSH  -     Hemoglobin A1C  -     Vitamin D    8. Multiple thyroid nodules  -     US Soft Tissue Head Neck Thyroid; Future; Expected date: 12/19/2023    Cbc, cmp, flp, tsh, A1c,  vitamin drawn today as well as hep c and hiv screening. Follows with high risk and gyn. No other problems at this time.      No results found for this or any previous visit (from the past 24 hour(s)).    Follow Up:  Follow up in about 6 months (around 6/19/2024) for Clinic Follow Up.      Discussed the diagnosis, prognosis, plan of care, chronic nature of and indications for, risks and benefits of treatment for conditions.  Continue all medications as prescribed unless otherwise noted.   Call if patient develops other symptoms or if not better in 2-3 days and sooner if worse. Emphasized the importance of compliance with all recommendations, including medication use and timely follow up. Instructed to return as noted be or sooner if patient develops any other problems or if there are any other additional questions or concerns.

## 2023-12-19 ENCOUNTER — OFFICE VISIT (OUTPATIENT)
Dept: FAMILY MEDICINE | Facility: CLINIC | Age: 58
End: 2023-12-19
Payer: MEDICAID

## 2023-12-19 VITALS
DIASTOLIC BLOOD PRESSURE: 74 MMHG | RESPIRATION RATE: 18 BRPM | OXYGEN SATURATION: 96 % | WEIGHT: 195 LBS | TEMPERATURE: 97 F | HEART RATE: 86 BPM | BODY MASS INDEX: 30.61 KG/M2 | HEIGHT: 67 IN | SYSTOLIC BLOOD PRESSURE: 126 MMHG

## 2023-12-19 DIAGNOSIS — Z11.4 ENCOUNTER FOR SCREENING FOR HIV: ICD-10-CM

## 2023-12-19 DIAGNOSIS — E04.2 MULTIPLE THYROID NODULES: ICD-10-CM

## 2023-12-19 DIAGNOSIS — Z11.59 NEED FOR HEPATITIS C SCREENING TEST: ICD-10-CM

## 2023-12-19 DIAGNOSIS — K21.9 GASTROESOPHAGEAL REFLUX DISEASE, UNSPECIFIED WHETHER ESOPHAGITIS PRESENT: Primary | ICD-10-CM

## 2023-12-19 DIAGNOSIS — J45.21 MILD INTERMITTENT ASTHMA WITH ACUTE EXACERBATION: ICD-10-CM

## 2023-12-19 DIAGNOSIS — Z79.899 LONG TERM CURRENT USE OF THERAPEUTIC DRUG: ICD-10-CM

## 2023-12-19 DIAGNOSIS — I10 HYPERTENSION, UNSPECIFIED TYPE: ICD-10-CM

## 2023-12-19 DIAGNOSIS — J30.9 CHRONIC ALLERGIC RHINITIS: ICD-10-CM

## 2023-12-19 LAB
ALBUMIN SERPL-MCNC: 4.2 G/DL (ref 3.4–5)
ALBUMIN/GLOB SERPL: 1.5 RATIO
ALP SERPL-CCNC: 75 UNIT/L (ref 50–144)
ALT SERPL-CCNC: 32 UNIT/L (ref 1–45)
ANION GAP SERPL CALC-SCNC: 6 MEQ/L (ref 2–13)
AST SERPL-CCNC: 31 UNIT/L (ref 14–36)
BASOPHILS # BLD AUTO: 0.02 X10(3)/MCL (ref 0.01–0.08)
BASOPHILS NFR BLD AUTO: 0.3 % (ref 0.1–1.2)
BILIRUB SERPL-MCNC: 0.4 MG/DL (ref 0–1)
BUN SERPL-MCNC: 15 MG/DL (ref 7–20)
CALCIUM SERPL-MCNC: 9.1 MG/DL (ref 8.4–10.2)
CHLORIDE SERPL-SCNC: 106 MMOL/L (ref 98–110)
CHOLEST SERPL-MCNC: 231 MG/DL (ref 0–200)
CO2 SERPL-SCNC: 27 MMOL/L (ref 21–32)
CREAT SERPL-MCNC: 0.65 MG/DL (ref 0.66–1.25)
CREAT/UREA NIT SERPL: 23 (ref 12–20)
EOSINOPHIL # BLD AUTO: 0.07 X10(3)/MCL (ref 0.04–0.36)
EOSINOPHIL NFR BLD AUTO: 1.1 % (ref 0.7–7)
ERYTHROCYTE [DISTWIDTH] IN BLOOD BY AUTOMATED COUNT: 12.3 % (ref 11–14.5)
EST. AVERAGE GLUCOSE BLD GHB EST-MCNC: 131.2 MG/DL (ref 70–115)
GFR SERPLBLD CREATININE-BSD FMLA CKD-EPI: >90 MLS/MIN/1.73/M2
GLOBULIN SER-MCNC: 2.8 GM/DL (ref 2–3.9)
GLUCOSE SERPL-MCNC: 108 MG/DL (ref 70–115)
HBA1C MFR BLD: 6.2 % (ref 4–6)
HCT VFR BLD AUTO: 38.7 % (ref 36–48)
HDLC SERPL-MCNC: 42 MG/DL (ref 40–60)
HGB BLD-MCNC: 13.3 G/DL (ref 11.8–16)
IMM GRANULOCYTES # BLD AUTO: 0.01 X10(3)/MCL (ref 0–0.03)
IMM GRANULOCYTES NFR BLD AUTO: 0.2 % (ref 0–0.5)
LDLC SERPL DIRECT ASSAY-SCNC: 146.4 MG/DL (ref 30–100)
LYMPHOCYTES # BLD AUTO: 1.89 X10(3)/MCL (ref 1.16–3.74)
LYMPHOCYTES NFR BLD AUTO: 29.1 % (ref 20–55)
MCH RBC QN AUTO: 30 PG (ref 27–34)
MCHC RBC AUTO-ENTMCNC: 34.4 G/DL (ref 31–37)
MCV RBC AUTO: 87.4 FL (ref 79–99)
MONOCYTES # BLD AUTO: 0.61 X10(3)/MCL (ref 0.24–0.36)
MONOCYTES NFR BLD AUTO: 9.4 % (ref 4.7–12.5)
NEUTROPHILS # BLD AUTO: 3.9 X10(3)/MCL (ref 1.56–6.13)
NEUTROPHILS NFR BLD AUTO: 59.9 % (ref 37–73)
NRBC BLD AUTO-RTO: 0 %
PLATELET # BLD AUTO: 316 X10(3)/MCL (ref 140–371)
PMV BLD AUTO: 10.5 FL (ref 9.4–12.4)
POTASSIUM SERPL-SCNC: 4.1 MMOL/L (ref 3.5–5.1)
PROT SERPL-MCNC: 7 GM/DL (ref 6.3–8.2)
RBC # BLD AUTO: 4.43 X10(6)/MCL (ref 4–5.1)
SODIUM SERPL-SCNC: 139 MMOL/L (ref 135–145)
TRIGL SERPL-MCNC: 212 MG/DL (ref 30–200)
TSH SERPL-ACNC: 1.92 UIU/ML (ref 0.36–3.74)
WBC # SPEC AUTO: 6.5 X10(3)/MCL (ref 4–11.5)

## 2023-12-19 PROCEDURE — 87389 HIV-1 AG W/HIV-1&-2 AB AG IA: CPT | Performed by: NURSE PRACTITIONER

## 2023-12-19 PROCEDURE — 3078F DIAST BP <80 MM HG: CPT | Mod: CPTII,,, | Performed by: NURSE PRACTITIONER

## 2023-12-19 PROCEDURE — 3074F PR MOST RECENT SYSTOLIC BLOOD PRESSURE < 130 MM HG: ICD-10-PCS | Mod: CPTII,,, | Performed by: NURSE PRACTITIONER

## 2023-12-19 PROCEDURE — 99214 OFFICE O/P EST MOD 30 MIN: CPT | Mod: ,,, | Performed by: NURSE PRACTITIONER

## 2023-12-19 PROCEDURE — 3074F SYST BP LT 130 MM HG: CPT | Mod: CPTII,,, | Performed by: NURSE PRACTITIONER

## 2023-12-19 PROCEDURE — 3078F PR MOST RECENT DIASTOLIC BLOOD PRESSURE < 80 MM HG: ICD-10-PCS | Mod: CPTII,,, | Performed by: NURSE PRACTITIONER

## 2023-12-19 PROCEDURE — 3008F PR BODY MASS INDEX (BMI) DOCUMENTED: ICD-10-PCS | Mod: CPTII,,, | Performed by: NURSE PRACTITIONER

## 2023-12-19 PROCEDURE — 86803 HEPATITIS C AB TEST: CPT | Performed by: NURSE PRACTITIONER

## 2023-12-19 PROCEDURE — 1159F PR MEDICATION LIST DOCUMENTED IN MEDICAL RECORD: ICD-10-PCS | Mod: CPTII,,, | Performed by: NURSE PRACTITIONER

## 2023-12-19 PROCEDURE — 99214 PR OFFICE/OUTPT VISIT, EST, LEVL IV, 30-39 MIN: ICD-10-PCS | Mod: ,,, | Performed by: NURSE PRACTITIONER

## 2023-12-19 PROCEDURE — 85025 COMPLETE CBC W/AUTO DIFF WBC: CPT | Performed by: NURSE PRACTITIONER

## 2023-12-19 PROCEDURE — 3008F BODY MASS INDEX DOCD: CPT | Mod: CPTII,,, | Performed by: NURSE PRACTITIONER

## 2023-12-19 PROCEDURE — 1159F MED LIST DOCD IN RCRD: CPT | Mod: CPTII,,, | Performed by: NURSE PRACTITIONER

## 2023-12-19 PROCEDURE — 80053 COMPREHEN METABOLIC PANEL: CPT | Performed by: NURSE PRACTITIONER

## 2023-12-19 PROCEDURE — 1160F RVW MEDS BY RX/DR IN RCRD: CPT | Mod: CPTII,,, | Performed by: NURSE PRACTITIONER

## 2023-12-19 PROCEDURE — 82306 VITAMIN D 25 HYDROXY: CPT | Performed by: NURSE PRACTITIONER

## 2023-12-19 PROCEDURE — 84443 ASSAY THYROID STIM HORMONE: CPT | Performed by: NURSE PRACTITIONER

## 2023-12-19 PROCEDURE — 80061 LIPID PANEL: CPT | Performed by: NURSE PRACTITIONER

## 2023-12-19 PROCEDURE — 1160F PR REVIEW ALL MEDS BY PRESCRIBER/CLIN PHARMACIST DOCUMENTED: ICD-10-PCS | Mod: CPTII,,, | Performed by: NURSE PRACTITIONER

## 2023-12-19 PROCEDURE — 83036 HEMOGLOBIN GLYCOSYLATED A1C: CPT | Performed by: NURSE PRACTITIONER

## 2023-12-19 RX ORDER — BUDESONIDE 0.5 MG/2ML
0.5 INHALANT ORAL DAILY
Qty: 60 ML | Refills: 11 | Status: SHIPPED | OUTPATIENT
Start: 2023-12-19 | End: 2024-12-18

## 2023-12-19 RX ORDER — NEBULIZER AND COMPRESSOR
EACH MISCELLANEOUS
Qty: 1 EACH | Refills: 0 | Status: SHIPPED | OUTPATIENT
Start: 2023-12-19 | End: 2023-12-21 | Stop reason: CLARIF

## 2023-12-19 RX ORDER — ALBUTEROL SULFATE 90 UG/1
2 AEROSOL, METERED RESPIRATORY (INHALATION) EVERY 6 HOURS PRN
Qty: 18 G | Refills: 0 | Status: SHIPPED | OUTPATIENT
Start: 2023-12-19 | End: 2024-12-18

## 2023-12-19 RX ORDER — HYDROCHLOROTHIAZIDE 25 MG/1
25 TABLET ORAL DAILY
Qty: 90 TABLET | Refills: 3 | Status: SHIPPED | OUTPATIENT
Start: 2023-12-19 | End: 2024-12-18

## 2023-12-19 RX ORDER — LORATADINE 10 MG/1
10 TABLET ORAL DAILY
Qty: 90 TABLET | Refills: 3 | Status: SHIPPED | OUTPATIENT
Start: 2023-12-19 | End: 2024-12-18

## 2023-12-19 RX ORDER — ALBUTEROL SULFATE 0.83 MG/ML
2.5 SOLUTION RESPIRATORY (INHALATION) EVERY 6 HOURS PRN
Qty: 3 ML | Refills: 0 | Status: SHIPPED | OUTPATIENT
Start: 2023-12-19 | End: 2024-12-18

## 2023-12-19 RX ORDER — PANTOPRAZOLE SODIUM 40 MG/1
40 TABLET, DELAYED RELEASE ORAL DAILY PRN
Qty: 30 TABLET | Refills: 11 | Status: SHIPPED | OUTPATIENT
Start: 2023-12-19 | End: 2024-01-18

## 2023-12-19 NOTE — ASSESSMENT & PLAN NOTE
Albuterol and budesonide prn, prefers nebulizer   She in need of nebulizer machine   Albuterol inhaler if needed

## 2023-12-20 LAB
DEPRECATED CALCIDIOL+CALCIFEROL SERPL-MC: 33 NG/ML (ref 30–80)
HIV 1+2 AB+HIV1 P24 AG SERPL QL IA: NONREACTIVE
MAYO GENERIC ORDERABLE RESULT: NORMAL
NONINV COLON CA DNA+OCC BLD SCRN STL QL: NEGATIVE

## 2023-12-21 ENCOUNTER — TELEPHONE (OUTPATIENT)
Dept: FAMILY MEDICINE | Facility: CLINIC | Age: 58
End: 2023-12-21
Payer: MEDICAID

## 2023-12-21 RX ORDER — NEBULIZER AND COMPRESSOR
1 EACH MISCELLANEOUS
Qty: 1 EACH | Refills: 0 | Status: SHIPPED | OUTPATIENT
Start: 2023-12-21 | End: 2024-01-03

## 2023-12-21 NOTE — TELEPHONE ENCOUNTER
----- Message from CATHY Toro sent at 12/21/2023  6:58 AM CST -----  Notify cmp good. Cholesterol high at 231, triglyceride 212, and . Lifestyle modifications. A1c prediabetic at 6.2. cbc good. HIV and hepc non reactive. Vitamin d therapeutic at 33. Thyroid function good. Recheck flp, cmp, and A1c in 3 months.

## 2024-01-03 ENCOUNTER — TELEPHONE (OUTPATIENT)
Dept: FAMILY MEDICINE | Facility: CLINIC | Age: 59
End: 2024-01-03
Payer: MEDICAID

## 2024-01-03 RX ORDER — NEBULIZER AND COMPRESSOR
1 EACH MISCELLANEOUS
Qty: 1 EACH | Refills: 0 | Status: SHIPPED | OUTPATIENT
Start: 2024-01-03

## 2024-01-03 NOTE — TELEPHONE ENCOUNTER
----- Message from Zahira Gonzalez sent at 1/3/2024  1:08 PM CST -----  Regarding: Signature  [facility] in [place] told her to call and make sure we got the notification that the request for her Nebulizer was not signed by Valerie and will need her signature before they can fill the order.      Please sign and send back to [facility]hat french [place]

## 2024-02-12 ENCOUNTER — HOSPITAL ENCOUNTER (OUTPATIENT)
Dept: RADIOLOGY | Facility: HOSPITAL | Age: 59
Discharge: HOME | End: 2024-02-12
Attending: NURSE PRACTITIONER
Payer: MEDICAID

## 2024-02-12 ENCOUNTER — TELEPHONE (OUTPATIENT)
Dept: FAMILY MEDICINE | Facility: CLINIC | Age: 59
End: 2024-02-12
Payer: MEDICAID

## 2024-02-12 DIAGNOSIS — E04.2 MULTIPLE THYROID NODULES: ICD-10-CM

## 2024-02-12 PROCEDURE — 76536 US EXAM OF HEAD AND NECK: CPT | Mod: TC

## 2024-02-12 NOTE — TELEPHONE ENCOUNTER
----- Message from Valerie Hurd, LISAP sent at 2/12/2024 12:57 PM CST -----  Thyroid nodules within both lobes but smal and benign. 1.5 cm suspected reactive lymph node to midpole of left lobe with no worrisome perfusion. Repeat u/s recommended in 6 months.

## 2024-03-25 PROCEDURE — 83036 HEMOGLOBIN GLYCOSYLATED A1C: CPT | Performed by: NURSE PRACTITIONER

## 2024-03-25 PROCEDURE — 80053 COMPREHEN METABOLIC PANEL: CPT | Performed by: NURSE PRACTITIONER

## 2024-03-25 PROCEDURE — 80061 LIPID PANEL: CPT | Performed by: NURSE PRACTITIONER

## 2024-03-26 ENCOUNTER — TELEPHONE (OUTPATIENT)
Dept: FAMILY MEDICINE | Facility: CLINIC | Age: 59
End: 2024-03-26
Payer: MEDICAID

## 2024-03-26 DIAGNOSIS — E78.2 MIXED HYPERLIPIDEMIA: Primary | ICD-10-CM

## 2024-03-26 RX ORDER — ATORVASTATIN CALCIUM 10 MG/1
10 TABLET, FILM COATED ORAL NIGHTLY
Qty: 90 TABLET | Refills: 1 | Status: SHIPPED | OUTPATIENT
Start: 2024-03-26

## 2024-03-26 NOTE — TELEPHONE ENCOUNTER
----- Message from CATHY Toro sent at 3/26/2024  6:33 AM CDT -----  Cholesterol 259 and . Begin atorvastin 10mg. A1c good at 5.8. cmp good. Recheck flp in 3 months

## 2024-06-20 ENCOUNTER — OFFICE VISIT (OUTPATIENT)
Dept: FAMILY MEDICINE | Facility: CLINIC | Age: 59
End: 2024-06-20
Payer: MEDICAID

## 2024-06-20 VITALS
TEMPERATURE: 98 F | BODY MASS INDEX: 30.61 KG/M2 | SYSTOLIC BLOOD PRESSURE: 142 MMHG | HEART RATE: 62 BPM | OXYGEN SATURATION: 98 % | RESPIRATION RATE: 20 BRPM | DIASTOLIC BLOOD PRESSURE: 98 MMHG | HEIGHT: 67 IN | WEIGHT: 195 LBS

## 2024-06-20 DIAGNOSIS — R53.82 CHRONIC FATIGUE: ICD-10-CM

## 2024-06-20 DIAGNOSIS — G47.33 OBSTRUCTIVE SLEEP APNEA: ICD-10-CM

## 2024-06-20 DIAGNOSIS — K21.9 GASTROESOPHAGEAL REFLUX DISEASE WITHOUT ESOPHAGITIS: ICD-10-CM

## 2024-06-20 DIAGNOSIS — J45.21 MILD INTERMITTENT ASTHMA WITH ACUTE EXACERBATION: ICD-10-CM

## 2024-06-20 DIAGNOSIS — E78.2 MIXED HYPERLIPIDEMIA: ICD-10-CM

## 2024-06-20 DIAGNOSIS — Z79.899 LONG TERM CURRENT USE OF THERAPEUTIC DRUG: ICD-10-CM

## 2024-06-20 DIAGNOSIS — I10 PRIMARY HYPERTENSION: Primary | ICD-10-CM

## 2024-06-20 LAB
ALBUMIN SERPL-MCNC: 4.5 G/DL (ref 3.4–5)
ALBUMIN/GLOB SERPL: 1.6 RATIO
ALP SERPL-CCNC: 66 UNIT/L (ref 50–144)
ALT SERPL-CCNC: 32 UNIT/L (ref 1–45)
ANION GAP SERPL CALC-SCNC: 6 MEQ/L (ref 2–13)
AST SERPL-CCNC: 30 UNIT/L (ref 14–36)
BASOPHILS # BLD AUTO: 0.05 X10(3)/MCL (ref 0.01–0.08)
BASOPHILS NFR BLD AUTO: 0.9 % (ref 0.1–1.2)
BILIRUB SERPL-MCNC: 0.4 MG/DL (ref 0–1)
BNP BLD-MCNC: 32.1 PG/ML (ref 0–124.9)
BUN SERPL-MCNC: 12 MG/DL (ref 7–20)
CALCIUM SERPL-MCNC: 9.3 MG/DL (ref 8.4–10.2)
CHLORIDE SERPL-SCNC: 107 MMOL/L (ref 98–110)
CHOLEST SERPL-MCNC: 255 MG/DL (ref 0–200)
CO2 SERPL-SCNC: 27 MMOL/L (ref 21–32)
CREAT SERPL-MCNC: 0.65 MG/DL (ref 0.66–1.25)
CREAT/UREA NIT SERPL: 18 (ref 12–20)
EOSINOPHIL # BLD AUTO: 0.26 X10(3)/MCL (ref 0.04–0.36)
EOSINOPHIL NFR BLD AUTO: 4.4 % (ref 0.7–7)
ERYTHROCYTE [DISTWIDTH] IN BLOOD BY AUTOMATED COUNT: 12.6 % (ref 11–14.5)
GFR SERPLBLD CREATININE-BSD FMLA CKD-EPI: >90 ML/MIN/1.73/M2
GLOBULIN SER-MCNC: 2.8 GM/DL (ref 2–3.9)
GLUCOSE SERPL-MCNC: 103 MG/DL (ref 70–115)
HCT VFR BLD AUTO: 39.7 % (ref 36–48)
HDLC SERPL-MCNC: 42 MG/DL (ref 40–60)
HGB BLD-MCNC: 13.5 G/DL (ref 11.8–16)
IMM GRANULOCYTES # BLD AUTO: 0.01 X10(3)/MCL (ref 0–0.03)
IMM GRANULOCYTES NFR BLD AUTO: 0.2 % (ref 0–0.5)
LDLC SERPL DIRECT ASSAY-SCNC: 166.9 MG/DL (ref 30–100)
LYMPHOCYTES # BLD AUTO: 1.93 X10(3)/MCL (ref 1.16–3.74)
LYMPHOCYTES NFR BLD AUTO: 32.9 % (ref 20–55)
MCH RBC QN AUTO: 30.1 PG (ref 27–34)
MCHC RBC AUTO-ENTMCNC: 34 G/DL (ref 31–37)
MCV RBC AUTO: 88.6 FL (ref 79–99)
MONOCYTES # BLD AUTO: 0.53 X10(3)/MCL (ref 0.24–0.36)
MONOCYTES NFR BLD AUTO: 9 % (ref 4.7–12.5)
NEUTROPHILS # BLD AUTO: 3.08 X10(3)/MCL (ref 1.56–6.13)
NEUTROPHILS NFR BLD AUTO: 52.6 % (ref 37–73)
NRBC BLD AUTO-RTO: 0 %
PLATELET # BLD AUTO: 254 X10(3)/MCL (ref 140–371)
PMV BLD AUTO: 11.2 FL (ref 9.4–12.4)
POTASSIUM SERPL-SCNC: 3.9 MMOL/L (ref 3.5–5.1)
PROT SERPL-MCNC: 7.3 GM/DL (ref 6.3–8.2)
RBC # BLD AUTO: 4.48 X10(6)/MCL (ref 4–5.1)
SODIUM SERPL-SCNC: 140 MMOL/L (ref 136–145)
TRIGL SERPL-MCNC: 183 MG/DL (ref 30–200)
TSH SERPL-ACNC: 2.17 UIU/ML (ref 0.36–3.74)
WBC # BLD AUTO: 5.86 X10(3)/MCL (ref 4–11.5)

## 2024-06-20 PROCEDURE — 80061 LIPID PANEL: CPT | Performed by: NURSE PRACTITIONER

## 2024-06-20 PROCEDURE — 85025 COMPLETE CBC W/AUTO DIFF WBC: CPT | Performed by: NURSE PRACTITIONER

## 2024-06-20 PROCEDURE — 83880 ASSAY OF NATRIURETIC PEPTIDE: CPT | Performed by: NURSE PRACTITIONER

## 2024-06-20 PROCEDURE — 80053 COMPREHEN METABOLIC PANEL: CPT | Performed by: NURSE PRACTITIONER

## 2024-06-20 PROCEDURE — 84443 ASSAY THYROID STIM HORMONE: CPT | Performed by: NURSE PRACTITIONER

## 2024-06-20 RX ORDER — AMLODIPINE BESYLATE 5 MG/1
5 TABLET ORAL DAILY
COMMUNITY
Start: 2024-06-17

## 2024-06-20 NOTE — PROGRESS NOTES
"SUBJECTIVE:  Suma Medrano is a 58 y.o. female here alone for 6 month follow up.    HPI Patient presents to the clinic for 6 month follow up. States still having acid reflux. Patient having heart palpitations and has been seeing cardiologist and having tests done. Was told EKG was abnormal and will be having stress test on her in a couple of weeks. With ongoing reflux but did prop pillow up and did have some improvement with lifestyle modifications. She did not start protonix. She denies abdominal pain or bloating. She did have some difficulty swallowing but did find related to dry mouth. She has been on amlodipine for two days and d/c'ed hctz per cardiologist. Slight elevation in blood pressure and this am in clinic. She did eat hamburger last night. She denies headaches. She does occasionally get dizzy. She is sleeping well at night. She does snore and does wake up gasping for air. She does not feel rested during day. She does fall asleep easy at night. She would like to sleep study done. Allergies okay but with some eye itching. She has not been taking regularly. Most concern is heart palpitations, dizziness, fatigued, occasional shortness of breath. She denies any swelling in legs or feet.    [place] allergies, medications, history, and problem list were updated as appropriate.    Review of Systems   Constitutional:  Positive for activity change and fatigue.   Respiratory:  Positive for shortness of breath.    Cardiovascular:  Positive for palpitations.   Neurological:  Positive for dizziness.      A comprehensive review of symptoms was completed and negative except as noted above.    OBJECTIVE:  Vital signs  Vitals:    06/20/24 0730   BP: (!) 142/98   BP Location: Right arm   Patient Position: Sitting   Pulse: 62   Resp: 20   Temp: 97.5 °F (36.4 °C)   TempSrc: Temporal   SpO2: 98%   Weight: 88.5 kg (195 lb)   Height: 5' 7" (1.702 m)        Physical Exam  Vitals and nursing note reviewed. "   Constitutional:       Appearance: Normal appearance.   HENT:      Head: Normocephalic and atraumatic.      Right Ear: Tympanic membrane, ear canal and external ear normal.      Left Ear: Tympanic membrane, ear canal and external ear normal.      Nose: Nose normal.      Mouth/Throat:      Mouth: Mucous membranes are moist.      Pharynx: Oropharynx is clear.   Eyes:      Extraocular Movements: Extraocular movements intact.      Conjunctiva/sclera: Conjunctivae normal.      Pupils: Pupils are equal, round, and reactive to light.   Cardiovascular:      Rate and Rhythm: Normal rate and regular rhythm.      Pulses: Normal pulses.      Heart sounds: Normal heart sounds.   Pulmonary:      Effort: Pulmonary effort is normal.      Breath sounds: Normal breath sounds.   Abdominal:      General: Abdomen is flat. Bowel sounds are normal.      Palpations: Abdomen is soft.   Musculoskeletal:         General: Normal range of motion.      Cervical back: Normal range of motion.   Skin:     General: Skin is warm and dry.      Capillary Refill: Capillary refill takes less than 2 seconds.   Neurological:      General: No focal deficit present.      Mental Status: She is alert.   Psychiatric:         Mood and Affect: Mood normal.         Behavior: Behavior normal.         Thought Content: Thought content normal.         Judgment: Judgment normal.          No visits with results within 1 Day(s) from this visit.   Latest known visit with results is:   Lab Visit on 03/25/2024   Component Date Value Ref Range Status    Sodium 03/25/2024 141  135 - 145 mmol/L Final    Potassium 03/25/2024 4.0  3.5 - 5.1 mmol/L Final    Chloride 03/25/2024 105  98 - 110 mmol/L Final    CO2 03/25/2024 29  21 - 32 mmol/L Final    Glucose 03/25/2024 110  70 - 115 mg/dL Final    Blood Urea Nitrogen 03/25/2024 14.0  7.0 - 20.0 mg/dL Final    Creatinine 03/25/2024 0.73  0.66 - 1.25 mg/dL Final    Calcium 03/25/2024 9.8  8.4 - 10.2 mg/dL Final    Protein Total  03/25/2024 7.5  6.3 - 8.2 gm/dL Final    Albumin 03/25/2024 4.6  3.4 - 5.0 g/dL Final    Globulin 03/25/2024 2.9  2.0 - 3.9 gm/dL Final    Albumin/Globulin Ratio 03/25/2024 1.6  ratio Final    Bilirubin Total 03/25/2024 0.4  0.0 - 1.0 mg/dL Final    ALP 03/25/2024 65  50 - 144 unit/L Final    ALT 03/25/2024 37  1 - 45 unit/L Final    AST 03/25/2024 34  14 - 36 unit/L Final    eGFR 03/25/2024 >90  mls/min/1.73/m2 Final                         EGFR INTERPRETATION    Beginning 8/15/22 we are reporting the eGFRcr calculation as recommended by the [employer]. The eGFRcr equation has similar overall performance characteristics to the older equation, but the values may differ by more than 10% particularly at higher values of eGFRcr and younger adult ages.    NKF stages of chronic kidney disease (CKD)  Stage 1: Kidney damage with normal or increased eGFR (>90 mL/min/1.73 m^2)  Stage 2: Mild reduction in GFR (60-89 mL/min/1.73 m^2)  Stage 3a: Moderate reduction in GFR (45-59 mL/min/1.73 m^2)  Stage 3b: Moderate reduction in GFR (30-44 mL/min/1.73 m^2)  Stage 4: Severe reduction in GFR (15-29 mL/min/1.73 m^2)  Stage 5: Kidney failure (GFR <15 mL/min/1.73 m^2)        Anion Gap 03/25/2024 7.0  2.0 - 13.0 mEq/L Final    BUN/Creatinine Ratio 03/25/2024 19  12 - 20 Final    Hemoglobin A1c 03/25/2024 5.8  4.0 - 6.0 % Final    Estimated Average Glucose 03/25/2024 119.8 (H)  70.0 - 115.0 mg/dL Final    Cholesterol Total 03/25/2024 258 (H)  0 - 200 mg/dL Final    HDL Cholesterol 03/25/2024 47  40 - 60 mg/dL Final    Triglyceride 03/25/2024 169  30 - 200 mg/dL Final    LDL Cholesterol Direct 03/25/2024 167.8 (H)  30.0 - 100.0 mg/dL Final          ASSESSMENT/PLAN:    1. Primary hypertension  Assessment & Plan:  Recently d/c hctz   Started amlodipine 5mg per cardiologist   Having echo and stress test in 2 weeks   Continue home blood pressure monitoring  Low sodium diet       2. Mild intermittent asthma with acute  exacerbation  Assessment & Plan:  Ongoing shortness of breath         3. Gastroesophageal reflux disease without esophagitis  Assessment & Plan:  Will start protonix       4. Mixed hyperlipidemia  Assessment & Plan:  Not currently taking statin   Recheck flp       5. Chronic fatigue          No results found for this or any previous visit (from the past 24 hour(s)).    Follow Up:  Follow up in about 3 months (around 9/20/2024) for Chronic Conditions with blood work pending results .      Discussed the diagnosis, prognosis, plan of care, chronic nature of and indications for, risks and benefits of treatment for conditions.  Continue all medications as prescribed unless otherwise noted.   Call if patient develops other symptoms or if not better in 2-3 days and sooner if worse. Emphasized the importance of compliance with all recommendations, including medication use and timely follow up. Instructed to return as noted be or sooner if patient develops any other problems or if there are any other additional questions or concerns.

## 2024-06-20 NOTE — ASSESSMENT & PLAN NOTE
Recently d/c hctz   Started amlodipine 5mg per cardiologist   Having echo and stress test in 2 weeks   Continue home blood pressure monitoring  Low sodium diet

## 2024-06-24 ENCOUNTER — TELEPHONE (OUTPATIENT)
Dept: FAMILY MEDICINE | Facility: CLINIC | Age: 59
End: 2024-06-24
Payer: MEDICAID

## 2024-06-24 NOTE — TELEPHONE ENCOUNTER
----- Message from CATHY Toro sent at 6/24/2024  6:23 AM CDT -----  Notify cholesterol high at 255 and . Recommend restarting statin. Cmp good. Cbc good. Thyroid good. Probnp normal. Recheck flp and cmp in 3 months

## 2024-09-05 NOTE — PROGRESS NOTES
SUBJECTIVE:  Suma Medrano is a 58 y.o. female here alone for hypertension.    HPI  Patient presents for 3 month follow up on chronic conditions. Monitoring blood pressure at home. Runs 130's/70's. Having rectal pain on right side of rectum for past 3 months. Trouble with constipation. Denies blood in stool. History of hemorrhoids. Colonoscopy 11/2021. Told IBS and start of diverticuli. Sometimes gets burning sensation in stomach with nausea. Going through menopause. Mammogram scheduled 10/24/24. Refuses flu vaccine. She is with recent raised rash for almost 7 months that began as a injury with open laceration. She does find getting larger and with slight itching at times. It is not tender. She does snore and does wake up gasping for air. She does not feel rested during day. She does fall asleep easy at night. She would like to sleep study done.     [place] allergies, medications, history, and problem list were updated as appropriate.    Review of Systems   Constitutional:  Positive for activity change and unexpected weight change.   HENT:  Negative for hearing loss, rhinorrhea and trouble swallowing.    Eyes:  Negative for discharge and visual disturbance.   Respiratory:  Negative for chest tightness and wheezing.    Cardiovascular:  Negative for chest pain and palpitations.   Gastrointestinal:  Positive for constipation. Negative for blood in stool, diarrhea and vomiting.   Endocrine: Positive for polydipsia and polyuria.   Genitourinary:  Negative for difficulty urinating, dysuria, hematuria and menstrual problem.   Musculoskeletal:  Positive for arthralgias. Negative for joint swelling and neck pain.   Neurological:  Negative for weakness and headaches.   Psychiatric/Behavioral:  Negative for confusion and dysphoric mood.       A comprehensive review of symptoms was completed and negative except as noted above.    OBJECTIVE:  Vital signs  Vitals:    09/09/24 0901   BP: 134/84   BP Location: Right arm  "  Patient Position: Sitting   Pulse: 75   Resp: 18   Temp: 96.8 °F (36 °C)   TempSrc: Temporal   SpO2: 96%   Weight: 90.1 kg (198 lb 9.6 oz)   Height: 5' 7" (1.702 m)        Physical Exam  Vitals and nursing note reviewed.   Constitutional:       Appearance: Normal appearance.   HENT:      Head: Normocephalic and atraumatic.      Right Ear: Tympanic membrane, ear canal and external ear normal.      Left Ear: Tympanic membrane, ear canal and external ear normal.      Nose: Nose normal.      Mouth/Throat:      Mouth: Mucous membranes are moist.      Pharynx: Oropharynx is clear.   Eyes:      Extraocular Movements: Extraocular movements intact.      Conjunctiva/sclera: Conjunctivae normal.      Pupils: Pupils are equal, round, and reactive to light.   Cardiovascular:      Rate and Rhythm: Normal rate and regular rhythm.      Pulses: Normal pulses.      Heart sounds: Normal heart sounds.   Pulmonary:      Effort: Pulmonary effort is normal.      Breath sounds: Normal breath sounds.   Abdominal:      General: Abdomen is flat. Bowel sounds are normal.      Palpations: Abdomen is soft.   Musculoskeletal:         General: Normal range of motion.      Cervical back: Normal range of motion.   Skin:     General: Skin is warm and dry.      Capillary Refill: Capillary refill takes less than 2 seconds.          Neurological:      General: No focal deficit present.      Mental Status: She is alert.   Psychiatric:         Mood and Affect: Mood normal.         Behavior: Behavior normal.         Thought Content: Thought content normal.         Judgment: Judgment normal.          No visits with results within 1 Day(s) from this visit.   Latest known visit with results is:   Office Visit on 06/20/2024   Component Date Value Ref Range Status    Sodium 06/20/2024 140  136 - 145 mmol/L Final    Potassium 06/20/2024 3.9  3.5 - 5.1 mmol/L Final    Chloride 06/20/2024 107  98 - 110 mmol/L Final    CO2 06/20/2024 27  21 - 32 mmol/L Final    " Glucose 06/20/2024 103  70 - 115 mg/dL Final    Blood Urea Nitrogen 06/20/2024 12  7.0 - 20.0 mg/dL Final    Creatinine 06/20/2024 0.65 (L)  0.66 - 1.25 mg/dL Final    Calcium 06/20/2024 9.3  8.4 - 10.2 mg/dL Final    Protein Total 06/20/2024 7.3  6.3 - 8.2 gm/dL Final    Albumin 06/20/2024 4.5  3.4 - 5.0 g/dL Final    Globulin 06/20/2024 2.8  2.0 - 3.9 gm/dL Final    Albumin/Globulin Ratio 06/20/2024 1.6  ratio Final    Bilirubin Total 06/20/2024 0.4  0.0 - 1.0 mg/dL Final    ALP 06/20/2024 66  50 - 144 unit/L Final    ALT 06/20/2024 32  1 - 45 unit/L Final    AST 06/20/2024 30  14 - 36 unit/L Final    eGFR 06/20/2024 >90  mL/min/1.73/m2 Final                         EGFR INTERPRETATION    Beginning 8/15/22 we are reporting the eGFRcr calculation as recommended by the [employer]. The eGFRcr equation has similar overall performance characteristics to the older equation, but the values may differ by more than 10% particularly at higher values of eGFRcr and younger adult ages.    NKF stages of chronic kidney disease (CKD)  Stage 1: Kidney damage with normal or increased eGFR (>90 mL/min/1.73 m^2)  Stage 2: Mild reduction in GFR (60-89 mL/min/1.73 m^2)  Stage 3a: Moderate reduction in GFR (45-59 mL/min/1.73 m^2)  Stage 3b: Moderate reduction in GFR (30-44 mL/min/1.73 m^2)  Stage 4: Severe reduction in GFR (15-29 mL/min/1.73 m^2)  Stage 5: Kidney failure (GFR <15 mL/min/1.73 m^2)        Anion Gap 06/20/2024 6.0  2.0 - 13.0 mEq/L Final    BUN/Creatinine Ratio 06/20/2024 18  12 - 20 Final    Cholesterol Total 06/20/2024 255 (H)  0 - 200 mg/dL Final    HDL Cholesterol 06/20/2024 42  40 - 60 mg/dL Final    Triglyceride 06/20/2024 183  30 - 200 mg/dL Final    LDL Cholesterol Direct 06/20/2024 166.9 (H)  30.0 - 100.0 mg/dL Final    TSH 06/20/2024 2.170  0.360 - 3.740 uIU/mL Final    ProBNP 06/20/2024 32.1  0.0 - 124.9 PG/ML Final      For ambulatory patients presenting to outpatient facilities with clinical  "suspicion of HF not previously diagnosed and at least one sign, symptom or risk factor for HF, NT-proBNP II test results should be interpreted as indicated below:    <125(pg/mL):"Negative: Heart Failure Unlikely"    >=125(pg/mL):"Consider Heart Failure as well as other causes of NT-proBNP elevation"          WBC 06/20/2024 5.86  4.00 - 11.50 x10(3)/mcL Final    RBC 06/20/2024 4.48  4.00 - 5.10 x10(6)/mcL Final    Hgb 06/20/2024 13.5  11.8 - 16.0 g/dL Final    Hct 06/20/2024 39.7  36.0 - 48.0 % Final    MCV 06/20/2024 88.6  79.0 - 99.0 fL Final    MCH 06/20/2024 30.1  27.0 - 34.0 pg Final    MCHC 06/20/2024 34.0  31.0 - 37.0 g/dL Final    RDW 06/20/2024 12.6  11.0 - 14.5 % Final    Platelet 06/20/2024 254  140 - 371 x10(3)/mcL Final    MPV 06/20/2024 11.2  9.4 - 12.4 fL Final    Neut % 06/20/2024 52.6  37 - 73 % Final    Lymph % 06/20/2024 32.9  20 - 55 % Final    Mono % 06/20/2024 9.0  4.7 - 12.5 % Final    Eos % 06/20/2024 4.4  0.7 - 7 % Final    Basophil % 06/20/2024 0.9  0.1 - 1.2 % Final    Lymph # 06/20/2024 1.93  1.16 - 3.74 x10(3)/mcL Final    Neut # 06/20/2024 3.08  1.56 - 6.13 x10(3)/mcL Final    Mono # 06/20/2024 0.53 (H)  0.24 - 0.36 x10(3)/mcL Final    Eos # 06/20/2024 0.26  0.04 - 0.36 x10(3)/mcL Final    Baso # 06/20/2024 0.05  0.01 - 0.08 x10(3)/mcL Final    IG# 06/20/2024 0.01  0.0001 - 0.031 x10(3)/mcL Final    IG% 06/20/2024 0.2  0 - 0.5 % Final    NRBC% 06/20/2024 0.0  <=1 % Final          ASSESSMENT/PLAN:    1. Hemorrhoids, unspecified hemorrhoid type  Assessment & Plan:  Will send hydrocortisone suppository     Orders:  -     hydrocortisone (ANUSOL-HC) 25 mg suppository; Place 1 suppository (25 mg total) rectally 2 (two) times daily. for 10 days  Dispense: 20 suppository; Refill: 0    2. Primary hypertension  Assessment & Plan:  With slight swelling to lower extremities with amlodipine. Will notify cardiologist to change medication if continues   Blood pressure without elevated       3. Chronic " fatigue  Assessment & Plan:  Referral sent last viist to [facility] sleep study  Will send at home sleep study       4. Skin rash  -     triamcinolone acetonide 0.1% (KENALOG) 0.1 % cream; Apply topically 2 (two) times daily.  Dispense: 453.6 g; Refill: 0    5. Suspected sleep apnea          No results found for this or any previous visit (from the past 24 hour(s)).    Follow Up:  Follow up in about 6 months (around 3/9/2025) for needs flp when available .    If a referral was sent and you are not notified and scheduled with specialist within 2 weeks, please notify office to ensure specialty appointment is scheduled in a timely manner.   Discussed the diagnosis, prognosis, plan of care, chronic nature of and indications for, risks and benefits of treatment for conditions.  Continue all medications as prescribed unless otherwise noted.   Call if patient develops other symptoms or if not better in 2-3 days and sooner if worse. Emphasized the importance of compliance with all recommendations, including medication use and timely follow up. Instructed to return as noted be or sooner if patient develops any other problems or if there are any other additional questions or concerns.

## 2024-09-09 ENCOUNTER — OFFICE VISIT (OUTPATIENT)
Dept: FAMILY MEDICINE | Facility: CLINIC | Age: 59
End: 2024-09-09
Payer: MEDICAID

## 2024-09-09 VITALS
WEIGHT: 198.63 LBS | RESPIRATION RATE: 18 BRPM | TEMPERATURE: 97 F | DIASTOLIC BLOOD PRESSURE: 84 MMHG | HEIGHT: 67 IN | OXYGEN SATURATION: 96 % | SYSTOLIC BLOOD PRESSURE: 134 MMHG | BODY MASS INDEX: 31.18 KG/M2 | HEART RATE: 75 BPM

## 2024-09-09 DIAGNOSIS — R29.818 SUSPECTED SLEEP APNEA: ICD-10-CM

## 2024-09-09 DIAGNOSIS — K64.9 HEMORRHOIDS, UNSPECIFIED HEMORRHOID TYPE: Primary | ICD-10-CM

## 2024-09-09 DIAGNOSIS — I10 PRIMARY HYPERTENSION: ICD-10-CM

## 2024-09-09 DIAGNOSIS — R53.82 CHRONIC FATIGUE: ICD-10-CM

## 2024-09-09 DIAGNOSIS — R21 SKIN RASH: ICD-10-CM

## 2024-09-09 PROCEDURE — 3079F DIAST BP 80-89 MM HG: CPT | Mod: CPTII,,, | Performed by: NURSE PRACTITIONER

## 2024-09-09 PROCEDURE — 99214 OFFICE O/P EST MOD 30 MIN: CPT | Mod: ,,, | Performed by: NURSE PRACTITIONER

## 2024-09-09 PROCEDURE — 3044F HG A1C LEVEL LT 7.0%: CPT | Mod: CPTII,,, | Performed by: NURSE PRACTITIONER

## 2024-09-09 PROCEDURE — 3075F SYST BP GE 130 - 139MM HG: CPT | Mod: CPTII,,, | Performed by: NURSE PRACTITIONER

## 2024-09-09 PROCEDURE — 1160F RVW MEDS BY RX/DR IN RCRD: CPT | Mod: CPTII,,, | Performed by: NURSE PRACTITIONER

## 2024-09-09 PROCEDURE — 1159F MED LIST DOCD IN RCRD: CPT | Mod: CPTII,,, | Performed by: NURSE PRACTITIONER

## 2024-09-09 PROCEDURE — 3008F BODY MASS INDEX DOCD: CPT | Mod: CPTII,,, | Performed by: NURSE PRACTITIONER

## 2024-09-09 RX ORDER — HYDROCORTISONE ACETATE 25 MG/1
25 SUPPOSITORY RECTAL 2 TIMES DAILY
Qty: 20 SUPPOSITORY | Refills: 0 | Status: SHIPPED | OUTPATIENT
Start: 2024-09-09 | End: 2024-09-19

## 2024-09-09 RX ORDER — TRIAMCINOLONE ACETONIDE 1 MG/G
CREAM TOPICAL 2 TIMES DAILY
Qty: 453.6 G | Refills: 0 | Status: SHIPPED | OUTPATIENT
Start: 2024-09-09 | End: 2024-10-09

## 2024-09-09 NOTE — ASSESSMENT & PLAN NOTE
With slight swelling to lower extremities with amlodipine. Will notify cardiologist to change medication if continues   Blood pressure without elevated

## 2024-09-24 PROCEDURE — 80053 COMPREHEN METABOLIC PANEL: CPT | Performed by: NURSE PRACTITIONER

## 2024-09-24 PROCEDURE — 80061 LIPID PANEL: CPT | Performed by: NURSE PRACTITIONER

## 2024-09-26 ENCOUNTER — TELEPHONE (OUTPATIENT)
Dept: FAMILY MEDICINE | Facility: CLINIC | Age: 59
End: 2024-09-26
Payer: MEDICAID

## 2024-09-26 NOTE — TELEPHONE ENCOUNTER
----- Message from CATHY Toro sent at 9/26/2024  7:03 AM CDT -----  Notify cholesterol 247 and . Is she willing to add nexletol 180mg. She is statin intolerant. Cmp good. Recheck cbc, cmp, flp, in 3 months

## 2024-09-26 NOTE — TELEPHONE ENCOUNTER
Pt notified. Was told that she wanted to started nexletol. States that she wants to do some research on it and get back with us. Recheck cbc, cmp, flp, in 3 months

## 2024-11-27 ENCOUNTER — HOSPITAL ENCOUNTER (OUTPATIENT)
Dept: RADIOLOGY | Facility: HOSPITAL | Age: 59
Discharge: HOME | End: 2024-11-27
Payer: MEDICAID

## 2024-11-27 VITALS — BODY MASS INDEX: 32.05 KG/M2 | HEIGHT: 66 IN

## 2024-11-27 DIAGNOSIS — Z91.89 AT HIGH RISK FOR BREAST CANCER: ICD-10-CM

## 2024-11-27 DIAGNOSIS — Z98.890 PERSONAL HISTORY OF BENIGN BREAST BIOPSY: ICD-10-CM

## 2024-11-27 DIAGNOSIS — Z12.31 SCREENING MAMMOGRAM FOR BREAST CANCER: ICD-10-CM

## 2024-11-27 PROCEDURE — 77063 BREAST TOMOSYNTHESIS BI: CPT | Mod: TC

## 2024-11-27 PROCEDURE — 76641 ULTRASOUND BREAST COMPLETE: CPT | Mod: TC,50

## 2024-12-02 ENCOUNTER — TELEPHONE (OUTPATIENT)
Dept: SURGERY | Facility: CLINIC | Age: 59
End: 2024-12-02
Payer: MEDICAID

## 2024-12-02 NOTE — TELEPHONE ENCOUNTER
I spoke with patient.  She states that her concerns of breast pain and redness have decreased since her mammogram.  She notices an area of hardness above areolar, but redness has decreased. Offered for patient to come in sooner than 11/9/2024, but she states that she has a GYN appointment on 12/4/2024, and she will see what her Gyn says.  Instructed to use Aleve or Ibuprofen if pain increases, and to call if symptoms get worse. Patient verbalizes understanding.        ----- Message from Portia sent at 12/2/2024  8:03 AM CST -----  Regarding: Breast Pain  Patient called this morning with c/o breast pain on right breast since 11/28/24 (mammo/us - 11/27/24) with redness, pain comes and goes.

## 2024-12-04 ENCOUNTER — OFFICE VISIT (OUTPATIENT)
Dept: OBSTETRICS AND GYNECOLOGY | Facility: CLINIC | Age: 59
End: 2024-12-04
Payer: MEDICAID

## 2024-12-04 VITALS
HEIGHT: 66 IN | WEIGHT: 198 LBS | DIASTOLIC BLOOD PRESSURE: 78 MMHG | BODY MASS INDEX: 31.82 KG/M2 | SYSTOLIC BLOOD PRESSURE: 128 MMHG

## 2024-12-04 DIAGNOSIS — N63.11 MASS OF UPPER OUTER QUADRANT OF RIGHT BREAST: ICD-10-CM

## 2024-12-04 DIAGNOSIS — N95.2 ATROPHIC VAGINITIS: ICD-10-CM

## 2024-12-04 DIAGNOSIS — Z12.4 PAP SMEAR FOR CERVICAL CANCER SCREENING: ICD-10-CM

## 2024-12-04 DIAGNOSIS — N60.12 BILATERAL FIBROCYSTIC BREAST DISEASE (FCBD): ICD-10-CM

## 2024-12-04 DIAGNOSIS — N60.11 BILATERAL FIBROCYSTIC BREAST DISEASE (FCBD): ICD-10-CM

## 2024-12-04 DIAGNOSIS — Z01.411 ABNORMAL GYNECOLOGICAL EXAMINATION: Primary | ICD-10-CM

## 2024-12-04 PROCEDURE — 87624 HPV HI-RISK TYP POOLED RSLT: CPT | Performed by: NURSE PRACTITIONER

## 2024-12-04 RX ORDER — LISINOPRIL 10 MG/1
10 TABLET ORAL
COMMUNITY
Start: 2024-11-29

## 2024-12-04 NOTE — PROGRESS NOTES
"Chief Complaint: Annual exam    Chief Complaint   Patient presents with    Well Woman     Last PAP 2023 WNL. MMG and breast u/s 24.   Reports "right breast lump after my MMG last week. It is very tender to touch". Has f/u with Breast center Monday.     Colonoscopy 21 benign  Cycles q 4-5 months. LMP "maybe 5 months ago"       HPI:   59 y.o. F  presents for an annual gyn exam.  , denies vaginal bleeding.    Had recent MMG and u/s 1 week ago, scheduled to see surgeon at breast center in [place] on 24.  Reports noted right breast lump after MMG done last week, tender.       Labs / Significant Studies:  Gyn History:    Menstrual History  Cycle: Yes (cycles "every few months")  Menarche Age: 14 years  Flow Duration: 7  Flow: (!) Heavy  Intermenstrual Bleeding: No  Dysmenorrhea: No    Menopause  Menopause Age: 56 years    Pap History  Last pap date: 23  Result: Normal  History of Abnormal Pap: No    [place]  Sexually Active: Yes  Postcoital Bleeding: No  Dyspareunia: No  STI History: No  Contraception: Yes  Contraception Type: Vasectomy    Breast History  Last Breast Imaging Date: Yes  Date: 24  History of Abnormal Breast Imaging : (!) Yes  Date:  ("4 yrs ago")  History of Breast Biopsy: Yes (hx of R breast bx- benign "")  Results of Last Biopsy: Yes (benign)    Family History   Problem Relation Name Age of Onset    Thyroid cancer Maternal Grandmother  76    Lung cancer Father Louis albarado 63    Cancer Father Louis albarado     Hypertension Father Louis albarado     Lung cancer Paternal Aunt      Asthma Mother Slime albarado     Diabetes Mother Slime albarado     Breast cancer Neg Hx      Colon cancer Neg Hx      Ovarian cancer Neg Hx      Uterine cancer Neg Hx           Past Medical History:   Diagnosis Date    Allergy     Anxiety     Arthritis     Asthma     Depression     Fibroid     GERD (gastroesophageal reflux disease)     " Hyperlipidemia     Hypertension      Past Surgical History:   Procedure Laterality Date    CHOLECYSTECTOMY      COLONOSCOPY  11/04/2021    Martha Hull MD  [facility]    COLONOSCOPY  11/04/2021    ESOPHAGOGASTRODUODENOSCOPY  11/04/2021    Martha Hull MD  [facility]       Current Outpatient Medications:     lisinopriL 10 MG tablet, Take 10 mg by mouth., Disp: , Rfl:     loratadine (CLARITIN) 10 mg tablet, Take 1 tablet (10 mg total) by mouth once daily., Disp: 90 tablet, Rfl: 3    pantoprazole (PROTONIX) 40 MG tablet, Take 40 mg by mouth., Disp: , Rfl:     amLODIPine (NORVASC) 5 MG tablet, Take 5 mg by mouth once daily., Disp: , Rfl:     triamcinolone acetonide 0.1% (KENALOG) 0.1 % cream, Apply topically 2 (two) times daily., Disp: 453.6 g, Rfl: 0    Review of patient's allergies indicates:   Allergen Reactions    Sulfa (sulfonamide antibiotics) Anaphylaxis       Social History     Tobacco Use    Smoking status: Never     Passive exposure: Never    Smokeless tobacco: Never   Substance Use Topics    Alcohol use: Yes     Comment: Wine or beer not even once a month    Drug use: Never       Review of Systems:  General/Constitutional: Chills denies. Fatigue/weakness denies. Fever denies. Night sweats denies. Hot flashes denies    Respiratory: Cough denies. Hemoptysis denies. SOB denies. Sputum production denies. Wheezing denies .   Cardiovascular: Chest pain denies . Dizziness denies. Palpitations denies. Swelling in hands/feet denies    Gastrointestinal: Abdominal pain denies. Blood in stool denies. Constipation denies. Diarrhea denies. Heartburn denies. Nausea denies. Vomiting denies    Genitourinary: Incontinence denies. Blood in urine denies. Frequent urination denies. Painful urination denies. Urinary urgency denies. Nocturia denies    Gynecologic: Irregular menses denies. Heavy bleeding denies. Painful menses denies. Vaginal discharge denies. Vaginal odor denies.  "Vaginal itching denies. Vaginal lesion denies. Pelvic pain denies. Decreased libido denies. Vulvar lesion denies. Prolapse of genital organs denies. Painful intercourse denies. Postcoital bleeding denies    Psychiatric: Depression denies. Anxiety denies     Physical Exam:   Vitals:    12/04/24 1139   BP: 128/78   BP Location: Left arm   Patient Position: Sitting   Weight: 89.8 kg (198 lb)   Height: 5' 6" (1.676 m)       Body mass index is 31.96 kg/m².       Chaperone: present.     General appearance: healthy, well-nourished and well-developed     Psychiatric: Orientation to time, place and person. Normal mood and affect and active, alert     Skin: Appearance: no rashes or lesions.     Neck:   Neck: supple, FROM, trachea midline. and no masses   Thyroid: no enlargement or nodules and non-tender.       Cardiovascular:   Auscultation: RRR and no murmur.   Peripheral Vascular: no varicosities, LLE edema, RLE edema, calf tenderness, and palpable cord and pedal pulses intact.     Lungs:   Respiratory effort: no intercostal retractions or accessory muscle usage.   Auscultation: no wheezing, rales/crackles, or rhonchi and clear to auscultation.     Breast:   Inspection/Palpation: no tenderness, discrete/distinct masses, skin changes, or abnormal secretions. Nipple appearance normal. Tender mass noted 11 o'clock areolar edge right breast, slightly mobile.    Abdomen:   Auscultation/Inspection/Palpation: no hepatomegaly, splenomegaly, masses, tenderness or CVA tenderness and soft, non-distended bowel sounds preset.    Hernia: no palpable hernias.     Female Genitalia:    Vulva: no masses, tenderness or lesions    Bladder/Urethra: no urethral discharge or mass, normal meatus, bladder non-distended.    Vagina: no tenderness, erythema, cystocele, rectocele, abnormal vaginal discharge or vesicle(s) or ulcers    Cervix: no discharge, no cervical lacerations noted or motion tenderness and grossly normal    Uterus: normal size and " shape and midline, non-tender, and no uterine prolapse.    Adnexa/Parametria: no parametrial tenderness or mass, no adnexal tenderness or ovarian mass.     Lymph Nodes:   Palpation: non tender submandibular nodes, axillary nodes, or inguinal nodes.     Rectal Exam:   Rectum: normal perianal skin.       Assessment:     Patient Active Problem List   Diagnosis    Gastroesophageal reflux disease    Mild intermittent asthma with acute exacerbation    Hypertension    Chronic allergic rhinitis    Mixed hyperlipidemia    Chronic fatigue    Hemorrhoids    Suspected sleep apnea       Health Maintenance Due   Topic Date Due    Mammogram  10/23/2024     Health Maintenance Topics with due status: Not Due       Topic Last Completion Date    TETANUS VACCINE 08/03/2021    Cervical Cancer Screening 11/14/2023    Colorectal Cancer Screening 12/11/2023    Hemoglobin A1c (Prediabetes) 03/25/2024    Lipid Panel 09/24/2024    RSV Vaccine (Age 60+ and Pregnant patients) Not Due         Plan:    Suma was seen today for well woman.    Diagnoses and all orders for this visit:    Pap smear for cervical cancer screening  -     Liquid-Based Pap Smear, Screening    Abnormal gynecological examination  PAP   Reviewed calcium needs, exercise, and prevention of osteoporosis.  Healthy diet  Annual MMG  Discussed breast self-awareness  Colonoscopy q 10 yrs  Reviewed normal menopause and menopausal symptoms  Strongly encouraged Shingles vaccination  RTC 1 yr   Atrophic vaginitis  - Educated     - Lubricants, coconut oil, baby oil    Bilateral fibrocystic breast disease (FCBD)    Mass of upper outer quadrant of right breast  To discuss with breast surgeon Monday at scheduled appt

## 2024-12-05 NOTE — PROGRESS NOTES
"  [facility]  Breast Surgical Oncology  High Risk Follow Up    Chief Complaint:   Chief Complaint   Patient presents with    Follow-up     Patient reports UOQ lump in right breast near aerola which appeared hours after last mammogram on 12/4/24,  swelling, redness, pain radiating to upper arm/shoulder, burning        Subjective:    Interval History:   12/09/2024 Patient here for high risk follow up. She is doing well in interval. Patient did call last week complaining of worsening right breast pain which began after her mammogram the week prior. She previously noticed redness around the areola as well as some hardening of this area. However, she states the pain and erythema have resolved. She does state she still feels a "knot" near her NAC.  She currently denies any other breast issues such as rashes, swelling, nipple discharge, or new lumps/masses.  She does not perform self breast exams.  Patient states she is no longer having regular menstrual cycles, and her last menstrual cycle was in July of this year.  Patient states she is experiencing menopausal symptoms such as hot flashes.  Patient's most recent screening mammogram and ultrasound last month resulted as benign.  Patient denies any significant interval changes or any changes in family history.  She has no other questions or concerns today.      History of Present Illness:  Ms. Suma Medrano a pleasant female patient who initially presents to breast clinic 7/22/2021 at age 55 with abnormal right breast imaging performed at an outside facility.     Her recent imaging began with a SCR MG with CAD at [facility] on 4/28/21, which noted a loosely clustered group of microcalcifications having an indeterminant appearance in the UOQ of the right breast. Further work up with a R DG MG with CAD and a R US on 5/11/2021, which was again indeterminant and recommended a breast MRI to evaluate the " calcifications. BL Breast MRI w w/o Contrast on 2021 at [facility] in [place] recommended stereotactic biopsy to target the previous noted regional calcifications. The patient has prior breast imaging that was done at [facility] many years ago but we are unable to obtain these records.     She does report a history of a trauma to the right breast upper outer quadrant when she was in her 20s consistent with a possible hematoma. She states that she has had a lump in this area since. She does report cyclic bilateral breast pain and fibrocystic changes around the time of menstruation.      According to NCCN guidelines,  she does not currently meet criteria for genetic testing. Will continue to monitor.    Of note, Her lifetime risk for breast cancer was calculated using the Tyrer-Cuzick model and found to be elevated (27.2%).    Imagin. 2021 SCR MG w/ CAD at [facility] - INCOMPLETE: Loosely clustered group of microcalcifications having indeterminate appearance are noted in the upper outer quadrant of the right breast.  These cannot be said with certainty to represent a benign process and further work-up with spot magnification views and ultrasound is recommended for better evaluation and to exclude neoplastic process.  2. 2021 R DG MG and R US - INCOMPLETE: Spot magnification views confirm the presence of loosely clustered, indeterminate microcalcifications with no worrisome underlying stellate mass appreciated.  No sonographic evidence of malignancy reported.  Further work-up with breast MRI is recommended to exclude remote possibility of a neoplastic process.  3. 2021 BL Breast MRI w/ w/o contrast - BIRADS 4B: Suspicious of malignancy.  Revealed a regional non-mass enhancements in the upper outer quadrant of the right breast measuring 5.8 x 1.9 cm. More medially in this area is a 14 mm area of non-mass enhancement within the  fundal glandular tissue. This most likely correlates with the area of heterogenous echogenicity on the prior US.  No enhancing mass or non-mass enhancement noted to the left breast. Biopsy recommended. Consider stereotactic biopsy to target the previous noted regional calcifications.  4. 8/16/2021 R DG MG and R breast US - SUSPICIOUS OF MALIGNANCY 1. Punctate and round calcifications in a regional distribution in the 10:00 right breast, middle to posterior depths, are suspicious. As these calcifications are seen sonographically, an ultrasound-guided biopsy of the calcifications in the 10:00 right breast, 4 cm from the nipple, is recommended. Please see separately dictated report from the ultrasound guided biopsy performed on the same day. 2. Fibrocystic changes in the upper outer quadrant of the right breast and 4:00 right breast are benign.  5. 10/23/2023 SC MG- There is no mammographic evidence of malignancy. BI-RADS: 2 Benign   6. 10/23/2023 Breast MRI- No MR evidence of malignancy in either breast.  Previously described non mass enhancement in the upper-outer quadrant of the right breast and central right breast seen on the prior MR dated 06/01/2021 is not significantly changed compared to the prior examination and is thus now considered benign given greater than 2 year stability. BI-RADS: 2 Benign  7. 11/27/2024 SC MG and US -   No mammographic or sonographic evidence of malignancy in either breast. Several benign cysts of varying size and complication within bilateral breasts. BIRADS: 2 Benign      Pathology:   1. 8/16/2021 Right breast 10:00 4 cm from nipple mass core needle biopsy-epithelial hyperplasia of the usual type, moderate.  Sclerosing adenosis.  Pseudoangiomatous stromal hyperplasia.  Cystic and apocrine change.  No atypia.  Periductal fibrosis.  Focal periductal chronic inflammation.  Focal intraductal calcification. A follow-up bilateral breast MR in December 2021 is recommended for follow-up of  the nonmass enhancement in the upper outer quadrant of the right breast as well as the non mass enhancement more medially within the right breast.    OB / GYN History   Menarche Onset: 15  Menopause: Perimenopausal   Hormonal birth control (duration): a few months  Pregnancies: 2  Age at first child birth: 26  Child births: 2  Breastfeeding duration:  2 years  Hysterectomy: no  Oophorectomy: no  HRT: no      Family History of Cancer (& age at diagnosis):   - Father: lung cancer (smoker)   - Paternal Aunt: lung cancer (smoker)   - Maternal GM: thyroid cancer (smoker)   - Denies history of breast, pancreatic, colon, or ovarian cancer      Lifestyle:  Smoker: Never (less than 100 in lifetime)  Height and Weight:   BMI:   ETOH consumption: yes,  occasionally      Other:  # of breast biopsies (when and pathology results): none  MG breast density: BIRADS C   Prior thoracic RT: none  Genetic testing: none  Ashkenazi Religion descent: No    Other History:     Past Medical History:   Diagnosis Date    Allergy     Anxiety     Arthritis     Asthma     Depression     Fibroid     GERD (gastroesophageal reflux disease)     Hyperlipidemia     Hypertension         Past Surgical History:   Procedure Laterality Date    CHOLECYSTECTOMY      COLONOSCOPY  11/04/2021    Martha Hull MD  [facility]    COLONOSCOPY  11/04/2021    ESOPHAGOGASTRODUODENOSCOPY  11/04/2021    Martha Hull MD  [facility]        Social History     Socioeconomic History    Marital status: Unknown   Tobacco Use    Smoking status: Never     Passive exposure: Never    Smokeless tobacco: Never   Substance and Sexual Activity    Alcohol use: Yes     Comment: Wine or beer not even once a month    Drug use: Never    Sexual activity: Not Currently     Partners: Male     Birth control/protection: Abstinence   Social History Narrative    ** Merged History Encounter **          Social Drivers of Health     Financial Resource  Strain: High Risk (9/3/2024)    Overall Financial Resource Strain (CARDIA)     Difficulty of Paying Living Expenses: Very hard   Food Insecurity: Food Insecurity Present (9/3/2024)    Hunger Vital Sign     Worried About Running Out of Food in the Last Year: Often true     Ran Out of Food in the Last Year: Sometimes true   Physical Activity: Unknown (9/3/2024)    Exercise Vital Sign     Days of Exercise per Week: 0 days   Stress: Stress Concern Present (9/3/2024)    [place] [employer] - Occupational Stress Questionnaire     Feeling of Stress : To some extent   Housing Stability: High Risk (9/3/2024)    Housing Stability Vital Sign     Unable to Pay for Housing in the Last Year: Yes        Immunization History   Administered Date(s) Administered    Tdap 08/03/2021        Medications/Allergies:    Current Outpatient Medications on File Prior to Visit   Medication Sig Dispense Refill    hydrocortisone (ANUSOL-HC) 25 mg suppository Place 25 mg rectally 2 (two) times daily as needed.      lisinopriL 10 MG tablet Take 10 mg by mouth.      loratadine (CLARITIN) 10 mg tablet Take 1 tablet (10 mg total) by mouth once daily. 90 tablet 3    pantoprazole (PROTONIX) 40 MG tablet Take 40 mg by mouth.      triamcinolone acetonide 0.1% (KENALOG) 0.1 % cream Apply topically 2 (two) times daily. 453.6 g 0    amLODIPine (NORVASC) 5 MG tablet Take 5 mg by mouth once daily. (Patient not taking: Reported on 12/9/2024)       No current facility-administered medications on file prior to visit.        Review of patient's allergies indicates:   Allergen Reactions    Sulfa (sulfonamide antibiotics) Anaphylaxis        Review of Systems:      Comprehensive ROS normal except: see HPI     Objective/Physical Exam     Vitals:    12/09/24 1039   BP: 115/70   Pulse: 64   Resp: 18   Temp: 97.7 °F (36.5 °C)          General: The patient is awake, alert and oriented times three. The patient is well nourished and in no acute  distress.  Neck: There is no evidence of palpable cervical, supraclavicular or axillary adenopathy. The neck is supple. The thyroid is not enlarged.  Musculoskeletal: The patient has a normal range of motion of her bilateral upper extremities.  Chest: Examination of the chest wall fails to reveal any obvious abnormalities.  The lungs are clear to auscultation bilaterally without rales, rhonchi, or wheezing.  Cardiovascular: The heart has a regular rate and rhythm without murmurs, gallops or rubs.  Breast:   Right:   Examination of right breast fails to reveal any dominant masses or areas of significant focal nodularity. The nipple is everted without evidence of discharge. There is no skin dimpling with movement of the pectoralis. There is no significant skin changes overlying the breast.  Left:  Examination of the left breast fails to reveal any dominant masses or areas of significant focal nodularity. The nipple is everted without evidence of discharge. There is no skin dimpling with movement of the pectoralis. There are no significant skin changes overlying the breast.  Abdomen: The abdomen is soft, flat, nontender and nondistended with no palpable masses or organomegaly.  Integumentary: no rashes or skin lesions present  Neurologic: cranial nerves intact, no signs of peripheral neurological deficit, motor/sensory function intact     Assessment and Plan     Patient Active Problem List   Diagnosis    Gastroesophageal reflux disease    Mild intermittent asthma with acute exacerbation    Hypertension    Chronic allergic rhinitis    Mixed hyperlipidemia    Chronic fatigue    Hemorrhoids    Suspected sleep apnea        Suma was seen today for follow-up.    Diagnoses and all orders for this visit:    At high risk for breast cancer  -     Mammo Digital Screening Bilat w/ Santino; Future  -     US BREAST SCREENING BILATERAL; Future    Personal history of benign breast biopsy  -     Mammo Digital Screening Bilat w/ Santino;  "Future  -     US BREAST SCREENING BILATERAL; Future    Screening mammogram for breast cancer  -     Mammo Digital Screening Bilat w/ Santino; Future    Mastalgia in female          ---------------------------------------------------------------------------------------------    PLAN:  1. Lifestyle - Healthy lifestyle guidelines were reviewed. She was encouraged to engage in regular exercise, maintain a healthy body weight, and avoid excessive alcohol consumption. Healthy nutritional guidelines were also discussed. Self-breast examination was reviewed with the patient in detail and she was encouraged to perform this on a monthly basis.    2. Screening - Next SC MG and US due in Nov 2025.      3.  Mastalgia - Patient complains of erythema, pain, and hardening around her right nipple areolar complex after mammogram about 2 weeks ago.  She states the symptoms lasted for about a week or so.  Patient states she still feels a "knot" near the 11:00 region of her nipple areolar complex.  On exam today, there were no concerning findings.  Discussed with patient that what she is feeling feels like benign breast tissue on exam and there were no concerning findings noted on most recent screening mammogram and ultrasound. Discussed with patient that erythema and pain that she was previously experiencing was likely due to inflammation of benign cysts vs dense breast tissue after mammogram. Discussed with patient that she should continue to monitor this area and if she notices any significant interval changes she should call our office for sooner appointment.  Also discussed ways to reduce breast pain/tenderness. Avoid caffeine (coffee, teas, chocolate, sodas). Drinking alcohol may also increase the risk of fibrocystic changes and breast pain. I also advised to wear a good, supportive bra both day and night when symptoms are worse. Avoid contact sports and other activities which could cause injury to the breasts. Also encouraged " patient to try Voltaren gel. Patient understands and is in agreement with this plan.     4. Follow up - RTC in 1 year.      All of her questions were answered. Please call our office with any questions or concerns that may arise before the next appointment.     Radha Akbar PA-C      Total time on the date of the visit ranged from 30-39 mins (54126). Total time includes both face-to-face and non-face-to-face time personally spent by myself on the day of the visit.    Non-face-to-face time included:  _X_ preparing to see the patient such as reviewing the patient record  __ obtaining and reviewing separately obtained history  _X_ independently interpreting results  _X_ documenting clinical information in electronic health record.    Face-to-face time included:  _X_ performing an appropriate history and examination  _X_ communicating results to the patient  _X_ counseling and educating the patient  __ ordering appropriate medications  __ ordering appropriate tests  _X_ ordering appropriate procedures (including follow-up)  _X_ answering any questions the patient had    Total Time spent on date of visit: 35 minutes

## 2024-12-09 ENCOUNTER — OFFICE VISIT (OUTPATIENT)
Dept: SURGERY | Facility: CLINIC | Age: 59
End: 2024-12-09
Payer: MEDICAID

## 2024-12-09 VITALS
HEIGHT: 66 IN | BODY MASS INDEX: 32.41 KG/M2 | OXYGEN SATURATION: 96 % | HEART RATE: 64 BPM | TEMPERATURE: 98 F | SYSTOLIC BLOOD PRESSURE: 115 MMHG | RESPIRATION RATE: 18 BRPM | DIASTOLIC BLOOD PRESSURE: 70 MMHG | WEIGHT: 201.63 LBS

## 2024-12-09 DIAGNOSIS — Z12.31 SCREENING MAMMOGRAM FOR BREAST CANCER: ICD-10-CM

## 2024-12-09 DIAGNOSIS — Z91.89 AT HIGH RISK FOR BREAST CANCER: Primary | ICD-10-CM

## 2024-12-09 DIAGNOSIS — N64.4 MASTALGIA IN FEMALE: ICD-10-CM

## 2024-12-09 DIAGNOSIS — Z98.890 PERSONAL HISTORY OF BENIGN BREAST BIOPSY: ICD-10-CM

## 2024-12-09 PROCEDURE — 3074F SYST BP LT 130 MM HG: CPT | Mod: CPTII,,,

## 2024-12-09 PROCEDURE — 99999 PR PBB SHADOW E&M-EST. PATIENT-LVL V: CPT | Mod: PBBFAC,,,

## 2024-12-09 PROCEDURE — 3078F DIAST BP <80 MM HG: CPT | Mod: CPTII,,,

## 2024-12-09 PROCEDURE — 99215 OFFICE O/P EST HI 40 MIN: CPT | Mod: PBBFAC

## 2024-12-09 PROCEDURE — 4010F ACE/ARB THERAPY RXD/TAKEN: CPT | Mod: CPTII,,,

## 2024-12-09 PROCEDURE — 1160F RVW MEDS BY RX/DR IN RCRD: CPT | Mod: CPTII,,,

## 2024-12-09 PROCEDURE — 1159F MED LIST DOCD IN RCRD: CPT | Mod: CPTII,,,

## 2024-12-09 PROCEDURE — 3008F BODY MASS INDEX DOCD: CPT | Mod: CPTII,,,

## 2024-12-09 PROCEDURE — 3044F HG A1C LEVEL LT 7.0%: CPT | Mod: CPTII,,,

## 2024-12-09 PROCEDURE — 99214 OFFICE O/P EST MOD 30 MIN: CPT | Mod: S$PBB,,,

## 2024-12-09 RX ORDER — HYDROCORTISONE ACETATE 25 MG/1
25 SUPPOSITORY RECTAL 2 TIMES DAILY PRN
COMMUNITY
Start: 2024-10-01

## 2024-12-09 NOTE — PATIENT INSTRUCTIONS
What is breast pain?  Breast pain (mastalgia) is a common type of discomfort among women, affecting as many as seven in 10 women at some point in their lives.  About 10 percent of women have moderate to severe breast pain more than five days a month. In some cases, severe breast pain lasts throughout the menstrual cycles.  The symptom occurs most frequently in young, premenopausal and perimenopausal women.  Breast pain alone rarely signifies breast cancer. Still, if you have unexplained breast pain that persists, get checked by your provider.     Causes of breast pain  Most of the time, it's not possible to identify the exact cause of breast pain. Likely contributors are hormonal changes, anatomical issues (breast trauma, prior breast surgery, or chest wall pain), breast size, and medication use (oral contraceptives, infertility treatments, estrogen/progesterone hormone therapy, and some antidepressants).     Treatments and supplements for breast pain  Use hot or cold compresses on your breasts.  Wear a firm support bra, fitted by a professional if possible.  Wear a sports bra during exercise and while sleeping, especially when your breast may be more sensitive.  Limit or eliminate caffeine, a dietary change many women swear by.  Decrease the fat in your diet.  Use a pain reliever, such as acetaminophen (Tylenol) or ibuprofen (Advil, Motrin) to alleviate breast pain.  May try Voltaren gel topical applications (2 g up to 4 times per day) to affected area of the breast.  Keep a journal noting when you experience breast pain and other symptoms, to determine if your pain is cyclic.  Vitamin E can help with breast pain (up to1,200 IU per day). Many women have found it to be helpful, but it usually takes up to 2 months of taking this to see a change.  Evening primrose oil; this supplement appears to change the balance of fatty acids in your cells, which may reduce breast pain. You can take a 1000 mg capsule up to three  times a day.     Adopted from [email or web address]

## 2025-02-12 ENCOUNTER — OFFICE VISIT (OUTPATIENT)
Dept: FAMILY MEDICINE | Facility: CLINIC | Age: 60
End: 2025-02-12
Payer: MEDICAID

## 2025-02-12 VITALS
DIASTOLIC BLOOD PRESSURE: 71 MMHG | RESPIRATION RATE: 20 BRPM | HEART RATE: 78 BPM | TEMPERATURE: 98 F | OXYGEN SATURATION: 96 % | WEIGHT: 200 LBS | HEIGHT: 66 IN | SYSTOLIC BLOOD PRESSURE: 122 MMHG | BODY MASS INDEX: 32.14 KG/M2

## 2025-02-12 DIAGNOSIS — L02.91 ABSCESS: Primary | ICD-10-CM

## 2025-02-12 PROCEDURE — 1160F RVW MEDS BY RX/DR IN RCRD: CPT | Mod: CPTII,,, | Performed by: NURSE PRACTITIONER

## 2025-02-12 PROCEDURE — 99214 OFFICE O/P EST MOD 30 MIN: CPT | Mod: ,,, | Performed by: NURSE PRACTITIONER

## 2025-02-12 PROCEDURE — 3078F DIAST BP <80 MM HG: CPT | Mod: CPTII,,, | Performed by: NURSE PRACTITIONER

## 2025-02-12 PROCEDURE — 3074F SYST BP LT 130 MM HG: CPT | Mod: CPTII,,, | Performed by: NURSE PRACTITIONER

## 2025-02-12 PROCEDURE — 1159F MED LIST DOCD IN RCRD: CPT | Mod: CPTII,,, | Performed by: NURSE PRACTITIONER

## 2025-02-12 PROCEDURE — 3008F BODY MASS INDEX DOCD: CPT | Mod: CPTII,,, | Performed by: NURSE PRACTITIONER

## 2025-02-12 PROCEDURE — 4010F ACE/ARB THERAPY RXD/TAKEN: CPT | Mod: CPTII,,, | Performed by: NURSE PRACTITIONER

## 2025-02-12 RX ORDER — CLINDAMYCIN HYDROCHLORIDE 300 MG/1
300 CAPSULE ORAL EVERY 6 HOURS
Qty: 28 CAPSULE | Refills: 0 | Status: SHIPPED | OUTPATIENT
Start: 2025-02-12 | End: 2025-02-19

## 2025-02-12 RX ORDER — MUPIROCIN 20 MG/G
OINTMENT TOPICAL 3 TIMES DAILY
Qty: 30 G | Refills: 0 | Status: SHIPPED | OUTPATIENT
Start: 2025-02-12 | End: 2025-02-26

## 2025-02-12 NOTE — PROGRESS NOTES
"SUBJECTIVE:  Suma Medrano is a 59 y.o. female here alone for Recurrent Skin Infections (To right leg/) and Cough      History of Present Illness    CHIEF COMPLAINT:  Patient presents today with a skin lesion.    SKIN CONCERNS:  She reports a skin lesion present for three days that initially appeared similar to an ant bite. She notes this presentation differs from her usual manifestations, which typically become larger by this point in time.    ALLERGIES:  She has an allergy to tape.      ROS:  General: -fever, -chills, -fatigue, -weight gain, -weight loss  Eyes: -vision changes, -redness, -discharge  ENT: -ear pain, -nasal congestion, -sore throat  Cardiovascular: -chest pain, -palpitations, -lower extremity edema  Respiratory: -cough, -shortness of breath  Gastrointestinal: -abdominal pain, -nausea, -vomiting, -diarrhea, -constipation, -blood in stool  Genitourinary: -dysuria, -hematuria, -frequency  Musculoskeletal: -joint pain, -muscle pain  Skin: -rash, +lesion  Neurological: -headache, -dizziness, -numbness, -tingling  Psychiatric: -anxiety, -depression, -sleep difficulty          [place] allergies, medications, history, and problem list were updated as appropriate.      A comprehensive review of symptoms was completed and negative except as noted above.    OBJECTIVE:  Vital signs  Vitals:    02/12/25 0809   BP: 122/71   BP Location: Left arm   Patient Position: Sitting   Pulse: 78   Resp: 20   Temp: 98.2 °F (36.8 °C)   SpO2: 96%   Weight: 90.7 kg (200 lb)   Height: 5' 6" (1.676 m)        Physical Exam  Vitals and nursing note reviewed.   Constitutional:       Appearance: Normal appearance.   HENT:      Head: Normocephalic and atraumatic.      Right Ear: Tympanic membrane, ear canal and external ear normal.      Left Ear: Tympanic membrane, ear canal and external ear normal.      Nose: Nose normal.      Mouth/Throat:      Mouth: Mucous membranes are moist.      Pharynx: Oropharynx is clear.   Eyes:    "   Extraocular Movements: Extraocular movements intact.      Conjunctiva/sclera: Conjunctivae normal.      Pupils: Pupils are equal, round, and reactive to light.   Cardiovascular:      Rate and Rhythm: Normal rate and regular rhythm.      Pulses: Normal pulses.      Heart sounds: Normal heart sounds.   Pulmonary:      Effort: Pulmonary effort is normal.      Breath sounds: Normal breath sounds.   Abdominal:      General: Abdomen is flat. Bowel sounds are normal.      Palpations: Abdomen is soft.   Musculoskeletal:         General: Normal range of motion.      Cervical back: Normal range of motion.   Skin:     General: Skin is warm and dry.      Capillary Refill: Capillary refill takes less than 2 seconds.             Comments: 3/4inx1/2in erythemic wound to right upper medial aspect of thigh    Neurological:      General: No focal deficit present.      Mental Status: She is alert.   Psychiatric:         Mood and Affect: Mood normal.         Behavior: Behavior normal.         Thought Content: Thought content normal.         Judgment: Judgment normal.                 ASSESSMENT/PLAN:  Assessment & Plan    IMPRESSION:  - Assessed skin lesion present for approximately 3 days  - Considered possibility of infection given appearance and progression  - Will initiate oral antibiotics to prevent further spread or complications  - Noted potential tape allergy, which may affect future wound care    - Started oral antibiotics.        1. Abscess  -     clindamycin (CLEOCIN) 300 MG capsule; Take 1 capsule (300 mg total) by mouth every 6 (six) hours. for 7 days  Dispense: 28 capsule; Refill: 0  -     mupirocin (BACTROBAN) 2 % ointment; Apply topically 3 (three) times daily. for 14 days  Dispense: 30 g; Refill: 0         Follow Up:  Follow up in about 3 months (around 5/12/2025), or if symptoms worsen or fail to improve, for Wellness .    If a referral was sent and you are not notified and scheduled with specialist within 2 weeks,  please notify office to ensure specialty appointment is scheduled in a timely manner.   Discussed the diagnosis, prognosis, plan of care, chronic nature of and indications for, risks and benefits of treatment for conditions.  Continue all medications as prescribed unless otherwise noted.   Call if patient develops other symptoms or if not better in 2-3 days and sooner if worse. Emphasized the importance of compliance with all recommendations, including medication use and timely follow up. Instructed to return as noted be or sooner if patient develops any other problems or if there are any other additional questions or concerns.      This note was generated with the assistance of ambient listening technology. Verbal consent was obtained by the patient and accompanying visitor(s) for the recording of patient appointment to facilitate this note. I attest to having reviewed and edited the generated note for accuracy, though some syntax or spelling errors may persist. Please contact the author of this note for any clarification.           Answers submitted by the patient for this visit:  Review of Systems Questionnaire (Submitted on 2/11/2025)  activity change: No  unexpected weight change: No  neck pain: No  hearing loss: No  rhinorrhea: No  trouble swallowing: No  eye discharge: No  visual disturbance: Yes  chest tightness: No  wheezing: No  chest pain: No  palpitations: Yes  blood in stool: No  constipation: Yes  vomiting: No  diarrhea: Yes  polydipsia: No  polyuria: No  difficulty urinating: No  hematuria: No  menstrual problem: Yes  dysuria: No  joint swelling: No  arthralgias: No  headaches: Yes  weakness: No  confusion: No  dysphoric mood: Yes

## 2025-02-17 ENCOUNTER — OFFICE VISIT (OUTPATIENT)
Dept: FAMILY MEDICINE | Facility: CLINIC | Age: 60
End: 2025-02-17
Payer: MEDICAID

## 2025-02-17 VITALS
HEART RATE: 83 BPM | OXYGEN SATURATION: 96 % | TEMPERATURE: 97 F | SYSTOLIC BLOOD PRESSURE: 134 MMHG | HEIGHT: 66 IN | WEIGHT: 202 LBS | BODY MASS INDEX: 32.47 KG/M2 | RESPIRATION RATE: 18 BRPM | DIASTOLIC BLOOD PRESSURE: 86 MMHG

## 2025-02-17 DIAGNOSIS — L98.9 SKIN LESION: Primary | ICD-10-CM

## 2025-02-17 DIAGNOSIS — I10 PRIMARY HYPERTENSION: ICD-10-CM

## 2025-02-17 DIAGNOSIS — R21 SKIN RASH: ICD-10-CM

## 2025-02-17 DIAGNOSIS — E78.2 MIXED HYPERLIPIDEMIA: ICD-10-CM

## 2025-02-17 LAB
ALBUMIN SERPL-MCNC: 4.7 G/DL (ref 3.4–5)
ALBUMIN/GLOB SERPL: 1.6 RATIO
ALP SERPL-CCNC: 81 UNIT/L (ref 50–144)
ALT SERPL-CCNC: 32 UNIT/L (ref 1–45)
ANION GAP SERPL CALC-SCNC: 10 MEQ/L (ref 2–13)
AST SERPL-CCNC: 29 UNIT/L (ref 14–36)
BASOPHILS # BLD AUTO: 0.05 X10(3)/MCL (ref 0.01–0.08)
BASOPHILS NFR BLD AUTO: 1 % (ref 0.1–1.2)
BILIRUB SERPL-MCNC: 0.5 MG/DL (ref 0–1)
BUN SERPL-MCNC: 11 MG/DL (ref 7–20)
CALCIUM SERPL-MCNC: 9.7 MG/DL (ref 8.4–10.2)
CHLORIDE SERPL-SCNC: 106 MMOL/L (ref 98–110)
CO2 SERPL-SCNC: 25 MMOL/L (ref 21–32)
CREAT SERPL-MCNC: 0.67 MG/DL (ref 0.66–1.25)
CREAT/UREA NIT SERPL: 16 (ref 12–20)
EOSINOPHIL # BLD AUTO: 0.25 X10(3)/MCL (ref 0.04–0.36)
EOSINOPHIL NFR BLD AUTO: 5 % (ref 0.7–7)
ERYTHROCYTE [DISTWIDTH] IN BLOOD BY AUTOMATED COUNT: 12.4 % (ref 11–14.5)
GFR SERPLBLD CREATININE-BSD FMLA CKD-EPI: >90 ML/MIN/1.73/M2
GLOBULIN SER-MCNC: 2.9 GM/DL (ref 2–3.9)
GLUCOSE SERPL-MCNC: 101 MG/DL (ref 70–115)
HCT VFR BLD AUTO: 38.3 % (ref 36–48)
HGB BLD-MCNC: 13.3 G/DL (ref 11.8–16)
IMM GRANULOCYTES # BLD AUTO: 0.01 X10(3)/MCL (ref 0–0.03)
IMM GRANULOCYTES NFR BLD AUTO: 0.2 % (ref 0–0.5)
LYMPHOCYTES # BLD AUTO: 1.9 X10(3)/MCL (ref 1.16–3.74)
LYMPHOCYTES NFR BLD AUTO: 38 % (ref 20–55)
MCH RBC QN AUTO: 30.6 PG (ref 27–34)
MCHC RBC AUTO-ENTMCNC: 34.7 G/DL (ref 31–37)
MCV RBC AUTO: 88 FL (ref 79–99)
MONOCYTES # BLD AUTO: 0.51 X10(3)/MCL (ref 0.24–0.36)
MONOCYTES NFR BLD AUTO: 10.2 % (ref 4.7–12.5)
NEUTROPHILS # BLD AUTO: 2.28 X10(3)/MCL (ref 1.56–6.13)
NEUTROPHILS NFR BLD AUTO: 45.6 % (ref 37–73)
NRBC BLD AUTO-RTO: 0 %
PLATELET # BLD AUTO: 282 X10(3)/MCL (ref 140–371)
PMV BLD AUTO: 11 FL (ref 9.4–12.4)
POTASSIUM SERPL-SCNC: 4.4 MMOL/L (ref 3.5–5.1)
PROT SERPL-MCNC: 7.6 GM/DL (ref 6.3–8.2)
RBC # BLD AUTO: 4.35 X10(6)/MCL (ref 4–5.1)
SODIUM SERPL-SCNC: 141 MMOL/L (ref 136–145)
WBC # BLD AUTO: 5 X10(3)/MCL (ref 4–11.5)

## 2025-02-17 PROCEDURE — 80053 COMPREHEN METABOLIC PANEL: CPT | Performed by: NURSE PRACTITIONER

## 2025-02-17 PROCEDURE — 85025 COMPLETE CBC W/AUTO DIFF WBC: CPT | Performed by: NURSE PRACTITIONER

## 2025-02-17 PROCEDURE — 86617 LYME DISEASE ANTIBODY: CPT | Performed by: NURSE PRACTITIONER

## 2025-02-17 RX ORDER — CLOTRIMAZOLE 1 %
CREAM (GRAM) TOPICAL 2 TIMES DAILY
Qty: 45 G | Refills: 0 | Status: SHIPPED | OUTPATIENT
Start: 2025-02-17

## 2025-02-17 RX ORDER — TRIAMCINOLONE ACETONIDE 1 MG/G
CREAM TOPICAL 2 TIMES DAILY
Qty: 453.6 G | Refills: 0 | Status: SHIPPED | OUTPATIENT
Start: 2025-02-17 | End: 2025-03-19

## 2025-02-17 RX ORDER — DOXYCYCLINE 100 MG/1
100 CAPSULE ORAL 2 TIMES DAILY
Qty: 20 CAPSULE | Refills: 0 | Status: SHIPPED | OUTPATIENT
Start: 2025-02-17 | End: 2025-02-27

## 2025-02-17 NOTE — PROGRESS NOTES
SUBJECTIVE:  Suma Medrano is a 59 y.o. female here alone for Abscess (F/U-Urgent Care said possible tick bite 2/16/25)      History of Present Illness    CHIEF COMPLAINT:  Patient presents today for follow up of a skin lesion    HISTORY OF PRESENT ILLNESS:  She initially developed a skin lesion 3 days prior to her visit on the 12th, which had slight oozy drainage. The condition has gradually worsened since then. She describes the lesion as intensely pruritic but notes it has not spread. She developed an allergic reaction to the bandaid used to cover the lesion. She was evaluated at urgent care where providers were concerned about Lyme disease, but no medications were prescribed. She was referred back to primary care for further evaluation. She did not take prescribed clindamycin over the weekend.    ASSOCIATED SYMPTOMS:  She denies fever. She reports chronic, non-severe tingling in her hands during sleep. She denies any other neurological symptoms including numbness.      ROS:  General: -fever, -chills, -fatigue, -weight gain, -weight loss  Eyes: -vision changes, -redness, -discharge  ENT: -ear pain, -nasal congestion, -sore throat  Cardiovascular: -chest pain, -palpitations, -lower extremity edema  Respiratory: -cough, -shortness of breath  Gastrointestinal: -abdominal pain, -nausea, -vomiting, -diarrhea, -constipation, -blood in stool  Genitourinary: -dysuria, -hematuria, -frequency  Musculoskeletal: -joint pain, -muscle pain  Skin: +rash, -lesion  Neurological: -headache, -dizziness, -numbness, -tingling  Psychiatric: -anxiety, -depression, -sleep difficulty          [place] allergies, medications, history, and problem list were updated as appropriate.      A comprehensive review of symptoms was completed and negative except as noted above.    OBJECTIVE:  Vital signs  Vitals:    02/17/25 0832   BP: 134/86   BP Location: Right arm   Patient Position: Sitting   Pulse: 83   Resp: 18   Temp: 96.8 °F (36 °C)  "  TempSrc: Temporal   SpO2: 96%   Weight: 91.6 kg (202 lb)   Height: 5' 6" (1.676 m)        Physical Exam  Vitals and nursing note reviewed.   Constitutional:       Appearance: Normal appearance.   HENT:      Head: Normocephalic and atraumatic.      Right Ear: Tympanic membrane, ear canal and external ear normal.      Left Ear: Tympanic membrane, ear canal and external ear normal.      Nose: Nose normal.      Mouth/Throat:      Mouth: Mucous membranes are moist.      Pharynx: Oropharynx is clear.   Eyes:      Extraocular Movements: Extraocular movements intact.      Conjunctiva/sclera: Conjunctivae normal.      Pupils: Pupils are equal, round, and reactive to light.   Cardiovascular:      Rate and Rhythm: Normal rate and regular rhythm.      Pulses: Normal pulses.      Heart sounds: Normal heart sounds.   Pulmonary:      Effort: Pulmonary effort is normal.      Breath sounds: Normal breath sounds.   Abdominal:      General: Abdomen is flat. Bowel sounds are normal.      Palpations: Abdomen is soft.   Musculoskeletal:         General: Normal range of motion.      Cervical back: Normal range of motion.   Skin:     General: Skin is warm and dry.      Capillary Refill: Capillary refill takes less than 2 seconds.             Comments: See . Circular lesion with central clearing, raised edges. Erythremic rash to outer area    Neurological:      General: No focal deficit present.      Mental Status: She is alert.   Psychiatric:         Mood and Affect: Mood normal.         Behavior: Behavior normal.         Thought Content: Thought content normal.         Judgment: Judgment normal.             ASSESSMENT/PLAN:  Assessment & Plan    L98.9 Skin lesion  E78.2 Mixed hyperlipidemia  I10 Primary hypertension    IMPRESSION:  - Assessed skin lesion present for about 10 days  - Considered Lyme disease based on urgent care concerns and potential fungal infection (ringworm) due to cat exposure  - Decided on dual " approach: antibiotic for possible Lyme and antifungal for potential ringworm    L98.9 SKIN LESION:  - Prescribed Doxycycline for 10 days to treat the skin condition.  - Prescribed topical antifungal medication for application to the skin lesion.  - Scheduled follow-up visit in 10 days to assess response to treatment.        1. Skin lesion  -     Cancel: Lyme Disease Ab, Immunoblot; Future; Expected date: 02/17/2025  -     Lyme Disease Ab, Immunoblot  -     doxycycline (VIBRAMYCIN) 100 MG Cap; Take 1 capsule (100 mg total) by mouth 2 (two) times daily. for 10 days  Dispense: 20 capsule; Refill: 0  -     clotrimazole (LOTRIMIN) 1 % cream; Apply topically 2 (two) times daily.  Dispense: 45 g; Refill: 0    2. Mixed hyperlipidemia  -     CBC Auto Differential  -     Comprehensive Metabolic Panel    3. Primary hypertension  -     CBC Auto Differential  -     Comprehensive Metabolic Panel    4. Skin rash  -     triamcinolone acetonide 0.1% (KENALOG) 0.1 % cream; Apply topically 2 (two) times daily.  Dispense: 453.6 g; Refill: 0     Will being doxy bid x10 days. Requesting immublot to r/o lyme disease. Apply clotrimazole bid to circular lesion. Triamcinolone to surrounding edges where band aid caused erythremic allergic reaction     Follow Up:  Follow up in about 2 weeks (around 3/3/2025).    If a referral was sent and you are not notified and scheduled with specialist within 2 weeks, please notify office to ensure specialty appointment is scheduled in a timely manner.   Discussed the diagnosis, prognosis, plan of care, chronic nature of and indications for, risks and benefits of treatment for conditions.  Continue all medications as prescribed unless otherwise noted.   Call if patient develops other symptoms or if not better in 2-3 days and sooner if worse. Emphasized the importance of compliance with all recommendations, including medication use and timely follow up. Instructed to return as noted be or sooner if patient  develops any other problems or if there are any other additional questions or concerns.      This note was generated with the assistance of ambient listening technology. Verbal consent was obtained by the patient and accompanying visitor(s) for the recording of patient appointment to facilitate this note. I attest to having reviewed and edited the generated note for accuracy, though some syntax or spelling errors may persist. Please contact the author of this note for any clarification.

## 2025-02-19 LAB
B BURGDOR AB PATRN SER IB-IMP: NORMAL
B BURGDOR IGG PATRN SER IB-IMP: NORMAL KDA
B BURGDOR IGG SER QL IB: NEGATIVE
B BURGDOR IGM PATRN SER IB-IMP: NORMAL KDA
B BURGDOR IGM SER QL IB: NEGATIVE

## 2025-02-20 ENCOUNTER — RESULTS FOLLOW-UP (OUTPATIENT)
Dept: FAMILY MEDICINE | Facility: CLINIC | Age: 60
End: 2025-02-20
Payer: MEDICAID

## 2025-02-20 NOTE — TELEPHONE ENCOUNTER
----- Message from CATHY Toro sent at 2/20/2025  7:04 AM CST -----  Notify cbc, cmp normal. Lyme test negative. Recommend antifungal cream to area if no improvement   ----- Message -----  From: Lab, Background User  Sent: 2/17/2025  11:35 AM CST  To: CATHY Toro     Rehab referral sent to [facility], also, patient is a new HD, SW following to arrange Rehab and HD, will follow.

## 2025-06-05 ENCOUNTER — RESULTS FOLLOW-UP (OUTPATIENT)
Dept: FAMILY MEDICINE | Facility: CLINIC | Age: 60
End: 2025-06-05

## 2025-06-05 ENCOUNTER — OFFICE VISIT (OUTPATIENT)
Dept: FAMILY MEDICINE | Facility: CLINIC | Age: 60
End: 2025-06-05
Payer: MEDICAID

## 2025-06-05 VITALS
HEIGHT: 66 IN | HEART RATE: 76 BPM | TEMPERATURE: 99 F | WEIGHT: 205 LBS | BODY MASS INDEX: 32.95 KG/M2 | DIASTOLIC BLOOD PRESSURE: 68 MMHG | OXYGEN SATURATION: 98 % | SYSTOLIC BLOOD PRESSURE: 112 MMHG

## 2025-06-05 DIAGNOSIS — I10 PRIMARY HYPERTENSION: ICD-10-CM

## 2025-06-05 DIAGNOSIS — L98.9 SKIN LESION: ICD-10-CM

## 2025-06-05 DIAGNOSIS — R89.9 ABNORMAL LABORATORY TEST RESULT: ICD-10-CM

## 2025-06-05 DIAGNOSIS — G47.33 OBSTRUCTIVE SLEEP APNEA: ICD-10-CM

## 2025-06-05 DIAGNOSIS — L98.9 SKIN LESION OF HAND: ICD-10-CM

## 2025-06-05 DIAGNOSIS — N92.0 MENORRHAGIA WITH REGULAR CYCLE: ICD-10-CM

## 2025-06-05 DIAGNOSIS — R73.01 IMPAIRED FASTING GLUCOSE: ICD-10-CM

## 2025-06-05 DIAGNOSIS — E78.2 MIXED HYPERLIPIDEMIA: ICD-10-CM

## 2025-06-05 DIAGNOSIS — N39.0 URINARY TRACT INFECTION WITHOUT HEMATURIA, SITE UNSPECIFIED: Primary | ICD-10-CM

## 2025-06-05 LAB
ALBUMIN SERPL-MCNC: 4.7 G/DL (ref 3.4–5)
ALBUMIN/GLOB SERPL: 1.6 RATIO
ALP SERPL-CCNC: 82 UNIT/L (ref 50–144)
ALT SERPL-CCNC: 36 UNIT/L (ref 1–45)
ANION GAP SERPL CALC-SCNC: 5 MEQ/L (ref 2–13)
AST SERPL-CCNC: 33 UNIT/L (ref 14–36)
BILIRUB SERPL-MCNC: 0.5 MG/DL (ref 0–1)
BILIRUB SERPL-MCNC: NEGATIVE MG/DL
BLOOD URINE, POC: NEGATIVE
BUN SERPL-MCNC: 11 MG/DL (ref 7–20)
CALCIUM SERPL-MCNC: 9.5 MG/DL (ref 8.4–10.2)
CHLORIDE SERPL-SCNC: 108 MMOL/L (ref 98–110)
CHOLEST SERPL-MCNC: 272 MG/DL (ref 0–200)
CLARITY, POC UA: CLEAR
CO2 SERPL-SCNC: 24 MMOL/L (ref 21–32)
COLOR, POC UA: YELLOW
CREAT SERPL-MCNC: 0.62 MG/DL (ref 0.66–1.25)
CREAT/UREA NIT SERPL: 18 (ref 12–20)
EST. AVERAGE GLUCOSE BLD GHB EST-MCNC: 128.4 MG/DL (ref 70–115)
GFR SERPLBLD CREATININE-BSD FMLA CKD-EPI: >90 ML/MIN/1.73/M2
GLOBULIN SER-MCNC: 3 GM/DL (ref 2–3.9)
GLUCOSE SERPL-MCNC: 108 MG/DL (ref 70–115)
GLUCOSE UR QL STRIP: NEGATIVE
HBA1C MFR BLD: 6.1 % (ref 4–6)
HDLC SERPL-MCNC: 42 MG/DL (ref 40–60)
IRON SATN MFR SERPL: 23 % (ref 20–50)
IRON SERPL-MCNC: 67 UG/DL (ref 50–170)
KETONES UR QL STRIP: NEGATIVE
LDLC SERPL DIRECT ASSAY-SCNC: 174.9 MG/DL (ref 30–100)
LEUKOCYTE ESTERASE URINE, POC: NEGATIVE
NITRITE, POC UA: NEGATIVE
PH, POC UA: 5.5
POTASSIUM SERPL-SCNC: 4.4 MMOL/L (ref 3.5–5.1)
PROT SERPL-MCNC: 7.7 GM/DL (ref 6.3–8.2)
PROTEIN, POC: NEGATIVE
SODIUM SERPL-SCNC: 137 MMOL/L (ref 136–145)
SPECIFIC GRAVITY, POC UA: 1.02
T4 FREE SERPL-MCNC: 0.86 NG/DL (ref 0.78–2.19)
TIBC SERPL-MCNC: 223 UG/DL (ref 70–310)
TIBC SERPL-MCNC: 290 UG/DL (ref 250–450)
TRANSFERRIN SERPL-MCNC: 272 MG/DL (ref 180–382)
TRIGL SERPL-MCNC: 236 MG/DL (ref 30–200)
TSH SERPL-ACNC: 1.97 UIU/ML (ref 0.36–3.74)
UROBILINOGEN, POC UA: NORMAL

## 2025-06-05 PROCEDURE — 84439 ASSAY OF FREE THYROXINE: CPT | Performed by: NURSE PRACTITIONER

## 2025-06-05 PROCEDURE — 83550 IRON BINDING TEST: CPT | Performed by: NURSE PRACTITIONER

## 2025-06-05 PROCEDURE — 84443 ASSAY THYROID STIM HORMONE: CPT | Performed by: NURSE PRACTITIONER

## 2025-06-05 PROCEDURE — 80053 COMPREHEN METABOLIC PANEL: CPT | Performed by: NURSE PRACTITIONER

## 2025-06-05 PROCEDURE — 83036 HEMOGLOBIN GLYCOSYLATED A1C: CPT | Performed by: NURSE PRACTITIONER

## 2025-06-05 PROCEDURE — 80061 LIPID PANEL: CPT | Performed by: NURSE PRACTITIONER

## 2025-06-05 RX ORDER — PHENAZOPYRIDINE HYDROCHLORIDE 200 MG/1
200 TABLET, FILM COATED ORAL 3 TIMES DAILY
Qty: 9 TABLET | Refills: 0 | Status: SHIPPED | OUTPATIENT
Start: 2025-06-05 | End: 2025-06-05 | Stop reason: SDUPTHER

## 2025-06-05 RX ORDER — CLOTRIMAZOLE 1 %
CREAM (GRAM) TOPICAL 2 TIMES DAILY
Qty: 45 G | Refills: 0 | Status: SHIPPED | OUTPATIENT
Start: 2025-06-05

## 2025-06-05 RX ORDER — PHENAZOPYRIDINE HYDROCHLORIDE 200 MG/1
200 TABLET, FILM COATED ORAL 3 TIMES DAILY
Qty: 9 TABLET | Refills: 0 | Status: SHIPPED | OUTPATIENT
Start: 2025-06-05 | End: 2025-06-08

## 2025-06-06 ENCOUNTER — TELEPHONE (OUTPATIENT)
Dept: OBSTETRICS AND GYNECOLOGY | Facility: CLINIC | Age: 60
End: 2025-06-06
Payer: MEDICAID

## 2025-06-11 ENCOUNTER — TELEPHONE (OUTPATIENT)
Dept: FAMILY MEDICINE | Facility: CLINIC | Age: 60
End: 2025-06-11
Payer: MEDICAID

## 2025-06-11 NOTE — LETTER
June 11, 2025      [address] [address]  Phone: [phone]  Fax: [phone]       Patient: Suma Medrano   YOB: 1965      To Suma Medrano,    We have been unable to contact you regarding your recent lab results. Please contact our office at [phone].    Sincerely,    Lana Donald LPN

## 2025-06-11 NOTE — TELEPHONE ENCOUNTER
----- Message from Princess Berry DNP sent at 6/5/2025  5:05 PM CDT -----  Notify patient LDL even higher at 174 patient declined statin ask if she would be willing to try with low-fat low-cholesterol diet beginning Zetia 10 to help decrease cardiovascular risk.  Remaining   in prediabetic range.  Despite heavy menses no evidence of anemia.  Thyroid normal.  Recheck CMP FLP in 3 months recheck hemoglobin A1c and CBC in six-month.  ----- Message -----  From: Zoraida Deleon LPN  Sent: 6/5/2025   9:44 AM CDT  To: Princess Berry DNP

## 2025-07-14 NOTE — PROGRESS NOTES
[facility]  Breast Surgical Oncology  High Risk Follow Up    Chief Complaint:   Chief Complaint   Patient presents with    Axillae Pain     Patient c/o intermittent right axillae area pain described as a burning sensation that radiates toward the right breast and nipple region, concerning area under the right axillae area that she would like assessed, reports breast drooping       Subjective:    Interval History:   07/16/2025 Patient here for concerns of worsening right breast pain which began after her mammogram in December 2024. She describes this pain as a dull/achy pain that comes and goes. She has not tried anything to help alleviate the breast pain. She does not drink a significant amount of caffeine. She currently denies any other breast issues such as rashes, swelling, nipple discharge, or new lumps/masses.  She does not perform self breast exams.  Patient states she is no longer having regular menstrual cycles, and her last menstrual cycle was in February of this year.  Patient states she is experiencing menopausal symptoms such as hot flashes. Patient denies any significant interval changes or any changes in family history.  She has no other questions or concerns today.      History of Present Illness:  Ms. Suma Medrano a pleasant female patient who initially presents to breast clinic 7/22/2021 at age 55 with abnormal right breast imaging performed at an outside facility.     Her recent imaging began with a SCR MG with CAD at [facility] on 4/28/21, which noted a loosely clustered group of microcalcifications having an indeterminant appearance in the UOQ of the right breast. Further work up with a R DG MG with CAD and a R US on 5/11/2021, which was again indeterminant and recommended a breast MRI to evaluate the calcifications. BL Breast MRI w w/o Contrast on 6/1/2021 at [facility] in [place] recommended  stereotactic biopsy to target the previous noted regional calcifications. The patient has prior breast imaging that was done at [facility] many years ago but we are unable to obtain these records.     She does report a history of a trauma to the right breast upper outer quadrant when she was in her 20s consistent with a possible hematoma. She states that she has had a lump in this area since. She does report cyclic bilateral breast pain and fibrocystic changes around the time of menstruation.      According to NCCN guidelines,  she does not currently meet criteria for genetic testing. Will continue to monitor.    Of note, Her lifetime risk for breast cancer was calculated using the Tyrer-Cuzick model and found to be elevated (27.2%).    Imagin. 2021 SCR MG w/ CAD at [facility] - INCOMPLETE: Loosely clustered group of microcalcifications having indeterminate appearance are noted in the upper outer quadrant of the right breast.  These cannot be said with certainty to represent a benign process and further work-up with spot magnification views and ultrasound is recommended for better evaluation and to exclude neoplastic process.  2. 2021 R DG MG and R US - INCOMPLETE: Spot magnification views confirm the presence of loosely clustered, indeterminate microcalcifications with no worrisome underlying stellate mass appreciated.  No sonographic evidence of malignancy reported.  Further work-up with breast MRI is recommended to exclude remote possibility of a neoplastic process.  3. 2021 BL Breast MRI w/ w/o contrast - BIRADS 4B: Suspicious of malignancy.  Revealed a regional non-mass enhancements in the upper outer quadrant of the right breast measuring 5.8 x 1.9 cm. More medially in this area is a 14 mm area of non-mass enhancement within the fundal glandular tissue. This most likely correlates with the area of heterogenous echogenicity on the prior US.  No  enhancing mass or non-mass enhancement noted to the left breast. Biopsy recommended. Consider stereotactic biopsy to target the previous noted regional calcifications.  4. 8/16/2021 R DG MG and R breast US - SUSPICIOUS OF MALIGNANCY 1. Punctate and round calcifications in a regional distribution in the 10:00 right breast, middle to posterior depths, are suspicious. As these calcifications are seen sonographically, an ultrasound-guided biopsy of the calcifications in the 10:00 right breast, 4 cm from the nipple, is recommended. Please see separately dictated report from the ultrasound guided biopsy performed on the same day. 2. Fibrocystic changes in the upper outer quadrant of the right breast and 4:00 right breast are benign.  5. 10/23/2023 SC MG- There is no mammographic evidence of malignancy. BI-RADS: 2 Benign   6. 10/23/2023 Breast MRI- No MR evidence of malignancy in either breast.  Previously described non mass enhancement in the upper-outer quadrant of the right breast and central right breast seen on the prior MR dated 06/01/2021 is not significantly changed compared to the prior examination and is thus now considered benign given greater than 2 year stability. BI-RADS: 2 Benign  7. 11/27/2024 SC MG and US -   No mammographic or sonographic evidence of malignancy in either breast. Several benign cysts of varying size and complication within bilateral breasts. BIRADS: 2 Benign   8. 12/5/2024 SC MG and US - No mammographic or sonographic evidence of malignancy in either breast. Several benign cysts of varying size and complication within bilateral breasts.     Pathology:   1. 8/16/2021 Right breast 10:00 4 cm from nipple mass core needle biopsy-epithelial hyperplasia of the usual type, moderate.  Sclerosing adenosis.  Pseudoangiomatous stromal hyperplasia.  Cystic and apocrine change.  No atypia.  Periductal fibrosis.  Focal periductal chronic inflammation.  Focal intraductal calcification. A follow-up  bilateral breast MR in December 2021 is recommended for follow-up of the nonmass enhancement in the upper outer quadrant of the right breast as well as the non mass enhancement more medially within the right breast.    OB / GYN History   Menarche Onset: 15  Menopause: Perimenopausal   Hormonal birth control (duration): a few months  Pregnancies: 2  Age at first child birth: 26  Child births: 2  Breastfeeding duration:  2 years  Hysterectomy: no  Oophorectomy: no  HRT: no      Family History of Cancer (& age at diagnosis):   - Father: lung cancer (smoker)   - Paternal Aunt: lung cancer (smoker)   - Maternal GM: thyroid cancer (smoker)   - Denies history of breast, pancreatic, colon, or ovarian cancer      Lifestyle:  Smoker: Never (less than 100 in lifetime)  Height and Weight:   BMI:   ETOH consumption: yes,  occasionally      Other:  # of breast biopsies (when and pathology results): none  MG breast density: BIRADS C   Prior thoracic RT: none  Genetic testing: none  Ashkenazi Bahai descent: No    Other History:     Past Medical History:   Diagnosis Date    Allergy     Amenorrhea 03/30/25    Anxiety     Arthritis     Asthma     Depression     Fibroid     GERD (gastroesophageal reflux disease)     Hyperlipidemia     Hypertension     Menopause     Pregnancy     Urinary incontinence         Past Surgical History:   Procedure Laterality Date    CHOLECYSTECTOMY      COLONOSCOPY  11/04/2021    Martha Hull MD  [facility]    COLONOSCOPY  11/04/2021    COLPOSCOPY      ESOPHAGOGASTRODUODENOSCOPY  11/04/2021    Martha Hull MD  [facility]        Social History     Socioeconomic History    Marital status: Unknown   Tobacco Use    Smoking status: Never     Passive exposure: Never    Smokeless tobacco: Never   Substance and Sexual Activity    Alcohol use: Yes     Comment: Wine or beer not even once a month    Drug use: Never    Sexual activity: Not Currently     Partners: Male      Birth control/protection: Abstinence   Social History Narrative    ** Merged History Encounter **          Social Drivers of Health     Financial Resource Strain: High Risk (9/3/2024)    Overall Financial Resource Strain (CARDIA)     Difficulty of Paying Living Expenses: Very hard   Food Insecurity: Food Insecurity Present (9/3/2024)    Hunger Vital Sign     Worried About Running Out of Food in the Last Year: Often true     Ran Out of Food in the Last Year: Sometimes true   Physical Activity: Unknown (9/3/2024)    Exercise Vital Sign     Days of Exercise per Week: 0 days   Stress: Stress Concern Present (9/3/2024)    [employer] - Occupational Stress Questionnaire     Feeling of Stress : To some extent   Housing Stability: High Risk (9/3/2024)    Housing Stability Vital Sign     Unable to Pay for Housing in the Last Year: Yes        Immunization History   Administered Date(s) Administered    Tdap 08/03/2021        Medications/Allergies:    Current Outpatient Medications on File Prior to Visit   Medication Sig Dispense Refill    clotrimazole (LOTRIMIN) 1 % cream Apply topically 2 (two) times daily. 45 g 0    lisinopriL 10 MG tablet Take 10 mg by mouth.      hydrocortisone (ANUSOL-HC) 25 mg suppository Place 25 mg rectally 2 (two) times daily as needed.       No current facility-administered medications on file prior to visit.        Review of patient's allergies indicates:   Allergen Reactions    Sulfa (sulfonamide antibiotics) Anaphylaxis        Review of Systems:      Comprehensive ROS normal except: see HPI     Objective/Physical Exam     Vitals:    07/16/25 1502   BP: 113/71   Pulse:    Resp:    Temp:             General: The patient is awake, alert and oriented times three. The patient is well nourished and in no acute distress.  Neck: There is no evidence of palpable cervical, supraclavicular or axillary adenopathy. The neck is supple. The thyroid is not enlarged.  Musculoskeletal:  The patient has a normal range of motion of her bilateral upper extremities.  Chest: Examination of the chest wall fails to reveal any obvious abnormalities.  The lungs are clear to auscultation bilaterally without rales, rhonchi, or wheezing.  Cardiovascular: The heart has a regular rate and rhythm without murmurs, gallops or rubs.  Breast:   Right:  Examination of right breast fails to reveal any dominant masses or areas of significant focal nodularity. Mild tenderness to palpation of right UOQ.  The nipple is everted without evidence of discharge. There is no skin dimpling with movement of the pectoralis. There is no significant skin changes overlying the breast.  Left:   Examination of the left breast fails to reveal any dominant masses or areas of significant focal nodularity. The nipple is everted without evidence of discharge. There is no skin dimpling with movement of the pectoralis. There are no significant skin changes overlying the breast.  Abdomen: The abdomen is soft, flat, nontender and nondistended with no palpable masses or organomegaly.  Integumentary: no rashes or skin lesions present  Neurologic: cranial nerves intact, no signs of peripheral neurological deficit, motor/sensory function intact     Assessment and Plan     Patient Active Problem List   Diagnosis    Gastroesophageal reflux disease    Mild intermittent asthma with acute exacerbation    Hypertension    Chronic allergic rhinitis    Mixed hyperlipidemia    Chronic fatigue    Hemorrhoids    Suspected sleep apnea    Obstructive sleep apnea    Skin lesion of hand        Suma was seen today for axillae pain.    Diagnoses and all orders for this visit:    Mastalgia in female    At high risk for breast cancer      ---------------------------------------------------------------------------------------------    PLAN:  1. Lifestyle - Healthy lifestyle guidelines were reviewed. She was encouraged to engage in regular exercise, maintain a  healthy body weight, and avoid excessive alcohol consumption. Healthy nutritional guidelines were also discussed. Self-breast examination was reviewed with the patient in detail and she was encouraged to perform this on a monthly basis.    2. Screening - Next SC MG and US due in Nov 2025.      3.  Mastalgia - On exam today, there were no concerning findings. Again discussed ways to reduce breast pain/tenderness. Avoid caffeine (coffee, teas, chocolate, sodas). Drinking alcohol may also increase the risk of fibrocystic changes and breast pain. I also advised to wear a good, supportive bra both day and night when symptoms are worse. Avoid contact sports and other activities which could cause injury to the breasts. Also encouraged patient to try Voltaren gel.  Advised patient to try conservative measures for 6 weeks.  I will see her back in clinic in 6 weeks to re-evaluate.  Discussed with patient that if she is still experiencing symptoms of breast pain at this time we will consider ordering diagnostic imaging.  Patient understands and is in agreement with this plan.  Advised patient to call our office if she notices any new or worsening symptoms in interval.    4. Follow up - RTC in 6 weeks.       All of her questions were answered. Please call our office with any questions or concerns that may arise before the next appointment.     Radha Akbar PA-C      Total time on the date of the visit ranged from 30-39 mins (87924). Total time includes both face-to-face and non-face-to-face time personally spent by myself on the day of the visit.    Non-face-to-face time included:  _X_ preparing to see the patient such as reviewing the patient record  __ obtaining and reviewing separately obtained history  _X_ independently interpreting results  _X_ documenting clinical information in electronic health record.    Face-to-face time included:  _X_ performing an appropriate history and examination  _X_ communicating results to the  patient  _X_ counseling and educating the patient  __ ordering appropriate medications  __ ordering appropriate tests  _X_ ordering appropriate procedures (including follow-up)  _X_ answering any questions the patient had    Total Time spent on date of visit: 35 minutes

## 2025-07-16 ENCOUNTER — OFFICE VISIT (OUTPATIENT)
Dept: SURGERY | Facility: CLINIC | Age: 60
End: 2025-07-16
Payer: MEDICAID

## 2025-07-16 VITALS
HEIGHT: 66 IN | BODY MASS INDEX: 32.76 KG/M2 | HEART RATE: 70 BPM | OXYGEN SATURATION: 96 % | DIASTOLIC BLOOD PRESSURE: 71 MMHG | WEIGHT: 203.81 LBS | TEMPERATURE: 98 F | RESPIRATION RATE: 18 BRPM | SYSTOLIC BLOOD PRESSURE: 113 MMHG

## 2025-07-16 DIAGNOSIS — Z91.89 AT HIGH RISK FOR BREAST CANCER: ICD-10-CM

## 2025-07-16 DIAGNOSIS — N64.4 MASTALGIA IN FEMALE: Primary | ICD-10-CM

## 2025-07-16 PROCEDURE — 3074F SYST BP LT 130 MM HG: CPT | Mod: CPTII,,,

## 2025-07-16 PROCEDURE — 4010F ACE/ARB THERAPY RXD/TAKEN: CPT | Mod: CPTII,,,

## 2025-07-16 PROCEDURE — 3008F BODY MASS INDEX DOCD: CPT | Mod: CPTII,,,

## 2025-07-16 PROCEDURE — 1159F MED LIST DOCD IN RCRD: CPT | Mod: CPTII,,,

## 2025-07-16 PROCEDURE — 3044F HG A1C LEVEL LT 7.0%: CPT | Mod: CPTII,,,

## 2025-07-16 PROCEDURE — 99215 OFFICE O/P EST HI 40 MIN: CPT | Mod: PBBFAC

## 2025-07-16 PROCEDURE — 3078F DIAST BP <80 MM HG: CPT | Mod: CPTII,,,

## 2025-07-16 PROCEDURE — 1160F RVW MEDS BY RX/DR IN RCRD: CPT | Mod: CPTII,,,

## 2025-07-16 PROCEDURE — 99214 OFFICE O/P EST MOD 30 MIN: CPT | Mod: S$PBB,,,

## 2025-07-16 PROCEDURE — 99999 PR PBB SHADOW E&M-EST. PATIENT-LVL V: CPT | Mod: PBBFAC,,,

## 2025-07-16 NOTE — PATIENT INSTRUCTIONS
What is breast pain?  Breast pain (mastalgia) is a common type of discomfort among women, affecting as many as seven in 10 women at some point in their lives.  About 10 percent of women have moderate to severe breast pain more than five days a month. In some cases, severe breast pain lasts throughout the menstrual cycles.  The symptom occurs most frequently in young, premenopausal and perimenopausal women.  Breast pain alone rarely signifies breast cancer. Still, if you have unexplained breast pain that persists, get checked by your provider.     Causes of breast pain  Most of the time, it's not possible to identify the exact cause of breast pain. Likely contributors are hormonal changes, anatomical issues (breast trauma, prior breast surgery, or chest wall pain), breast size, and medication use (oral contraceptives, infertility treatments, estrogen/progesterone hormone therapy, and some antidepressants).     Treatments and supplements for breast pain  Use hot or cold compresses on your breasts.  Wear a firm support bra, fitted by a professional if possible.  Wear a sports bra during exercise and while sleeping, especially when your breast may be more sensitive.  Limit or eliminate caffeine, a dietary change many women swear by.  Decrease the fat in your diet.  Use a pain reliever, such as acetaminophen (Tylenol) or ibuprofen (Advil, Motrin) to alleviate breast pain.  May try Voltaren gel topical applications (2 g up to 4 times per day) to affected area of the breast.  Keep a journal noting when you experience breast pain and other symptoms, to determine if your pain is cyclic.  Vitamin E can help with breast pain (up to1,200 IU per day). Many women have found it to be helpful, but it usually takes up to 2 months of taking this to see a change.  Evening primrose oil; this supplement appears to change the balance of fatty acids in your cells, which may reduce breast pain. You can take a 1000 mg capsule up to three  times a day.     Adopted from [email or web address]